# Patient Record
Sex: FEMALE | Race: WHITE | NOT HISPANIC OR LATINO
[De-identification: names, ages, dates, MRNs, and addresses within clinical notes are randomized per-mention and may not be internally consistent; named-entity substitution may affect disease eponyms.]

---

## 2018-04-06 ENCOUNTER — TRANSCRIPTION ENCOUNTER (OUTPATIENT)
Age: 28
End: 2018-04-06

## 2018-04-14 ENCOUNTER — TRANSCRIPTION ENCOUNTER (OUTPATIENT)
Age: 28
End: 2018-04-14

## 2018-10-08 ENCOUNTER — TRANSCRIPTION ENCOUNTER (OUTPATIENT)
Age: 28
End: 2018-10-08

## 2019-01-21 ENCOUNTER — RESULT REVIEW (OUTPATIENT)
Age: 29
End: 2019-01-21

## 2019-02-17 ENCOUNTER — TRANSCRIPTION ENCOUNTER (OUTPATIENT)
Age: 29
End: 2019-02-17

## 2019-02-26 ENCOUNTER — TRANSCRIPTION ENCOUNTER (OUTPATIENT)
Age: 29
End: 2019-02-26

## 2019-03-06 ENCOUNTER — OUTPATIENT (OUTPATIENT)
Dept: OUTPATIENT SERVICES | Facility: HOSPITAL | Age: 29
LOS: 1 days | End: 2019-03-06
Payer: COMMERCIAL

## 2019-03-06 VITALS
DIASTOLIC BLOOD PRESSURE: 84 MMHG | WEIGHT: 246.92 LBS | RESPIRATION RATE: 16 BRPM | SYSTOLIC BLOOD PRESSURE: 124 MMHG | TEMPERATURE: 97 F | HEART RATE: 86 BPM

## 2019-03-06 DIAGNOSIS — Z29.9 ENCOUNTER FOR PROPHYLACTIC MEASURES, UNSPECIFIED: ICD-10-CM

## 2019-03-06 DIAGNOSIS — E66.9 OBESITY, UNSPECIFIED: ICD-10-CM

## 2019-03-06 DIAGNOSIS — Z01.818 ENCOUNTER FOR OTHER PREPROCEDURAL EXAMINATION: ICD-10-CM

## 2019-03-06 DIAGNOSIS — N83.209 UNSPECIFIED OVARIAN CYST, UNSPECIFIED SIDE: ICD-10-CM

## 2019-03-06 DIAGNOSIS — R10.2 PELVIC AND PERINEAL PAIN: ICD-10-CM

## 2019-03-06 DIAGNOSIS — N36.9 URETHRAL DISORDER, UNSPECIFIED: Chronic | ICD-10-CM

## 2019-03-06 LAB
ANION GAP SERPL CALC-SCNC: 10 MMOL/L — SIGNIFICANT CHANGE UP (ref 5–17)
BASOPHILS # BLD AUTO: 0 K/UL — SIGNIFICANT CHANGE UP (ref 0–0.2)
BASOPHILS NFR BLD AUTO: 0.2 % — SIGNIFICANT CHANGE UP (ref 0–2)
BUN SERPL-MCNC: 12 MG/DL — SIGNIFICANT CHANGE UP (ref 8–20)
CALCIUM SERPL-MCNC: 9.3 MG/DL — SIGNIFICANT CHANGE UP (ref 8.6–10.2)
CHLORIDE SERPL-SCNC: 102 MMOL/L — SIGNIFICANT CHANGE UP (ref 98–107)
CO2 SERPL-SCNC: 26 MMOL/L — SIGNIFICANT CHANGE UP (ref 22–29)
CREAT SERPL-MCNC: 0.58 MG/DL — SIGNIFICANT CHANGE UP (ref 0.5–1.3)
EOSINOPHIL # BLD AUTO: 0.1 K/UL — SIGNIFICANT CHANGE UP (ref 0–0.5)
EOSINOPHIL NFR BLD AUTO: 0.6 % — SIGNIFICANT CHANGE UP (ref 0–6)
GLUCOSE SERPL-MCNC: 82 MG/DL — SIGNIFICANT CHANGE UP (ref 70–115)
HCT VFR BLD CALC: 38.8 % — SIGNIFICANT CHANGE UP (ref 37–47)
HGB BLD-MCNC: 13.3 G/DL — SIGNIFICANT CHANGE UP (ref 12–16)
LYMPHOCYTES # BLD AUTO: 25.3 % — SIGNIFICANT CHANGE UP (ref 20–55)
LYMPHOCYTES # BLD AUTO: 3.1 K/UL — SIGNIFICANT CHANGE UP (ref 1–4.8)
MCHC RBC-ENTMCNC: 29.2 PG — SIGNIFICANT CHANGE UP (ref 27–31)
MCHC RBC-ENTMCNC: 34.3 G/DL — SIGNIFICANT CHANGE UP (ref 32–36)
MCV RBC AUTO: 85.3 FL — SIGNIFICANT CHANGE UP (ref 81–99)
MONOCYTES # BLD AUTO: 0.9 K/UL — HIGH (ref 0–0.8)
MONOCYTES NFR BLD AUTO: 7.6 % — SIGNIFICANT CHANGE UP (ref 3–10)
NEUTROPHILS # BLD AUTO: 8.1 K/UL — HIGH (ref 1.8–8)
NEUTROPHILS NFR BLD AUTO: 66 % — SIGNIFICANT CHANGE UP (ref 37–73)
PLATELET # BLD AUTO: 295 K/UL — SIGNIFICANT CHANGE UP (ref 150–400)
POTASSIUM SERPL-MCNC: 4.1 MMOL/L — SIGNIFICANT CHANGE UP (ref 3.5–5.3)
POTASSIUM SERPL-SCNC: 4.1 MMOL/L — SIGNIFICANT CHANGE UP (ref 3.5–5.3)
RBC # BLD: 4.55 M/UL — SIGNIFICANT CHANGE UP (ref 4.4–5.2)
RBC # FLD: 13.6 % — SIGNIFICANT CHANGE UP (ref 11–15.6)
SODIUM SERPL-SCNC: 138 MMOL/L — SIGNIFICANT CHANGE UP (ref 135–145)
WBC # BLD: 12.3 K/UL — HIGH (ref 4.8–10.8)
WBC # FLD AUTO: 12.3 K/UL — HIGH (ref 4.8–10.8)

## 2019-03-06 PROCEDURE — 80048 BASIC METABOLIC PNL TOTAL CA: CPT

## 2019-03-06 PROCEDURE — 85027 COMPLETE CBC AUTOMATED: CPT

## 2019-03-06 PROCEDURE — 36415 COLL VENOUS BLD VENIPUNCTURE: CPT

## 2019-03-06 RX ORDER — SODIUM CHLORIDE 9 MG/ML
3 INJECTION INTRAMUSCULAR; INTRAVENOUS; SUBCUTANEOUS ONCE
Qty: 0 | Refills: 0 | Status: DISCONTINUED | OUTPATIENT
Start: 2019-03-11 | End: 2019-03-26

## 2019-03-06 NOTE — H&P PST ADULT - FAMILY HISTORY
Father  Still living? Yes, Estimated age: 61-70  Family history of diabetes mellitus, Age at diagnosis: Age Unknown  Family history of heart valve abnormality, Age at diagnosis: Age Unknown

## 2019-03-06 NOTE — H&P PST ADULT - HISTORY OF PRESENT ILLNESS
28 yr old female presents with c/o left lower abdominal  pain over past 2 months ( since dec 2018 )  Pain worse with pressure and before periods. LMP 19   . Last pap 2019 with internal sono and left ovarian cyst noted .

## 2019-03-06 NOTE — H&P PST ADULT - LAB RESULTS AND INTERPRETATION
wbc 12.3 results faxed to DR. Parul Alberto and called to Lacey in office . K DAmico NP 10:30 am 3/7/19

## 2019-03-06 NOTE — H&P PST ADULT - PSH
Urethral disorder  urethral dialtion at 20 yr old foe cystistis   chronic  UTI "S   no problem since then Urethral disorder  urethral dilation  at 20 yr old for cystitis  chronic  UTI "S   no problem since then

## 2019-03-11 ENCOUNTER — OUTPATIENT (OUTPATIENT)
Dept: INPATIENT UNIT | Facility: HOSPITAL | Age: 29
LOS: 1 days | Discharge: HOME | End: 2019-03-11
Payer: COMMERCIAL

## 2019-03-11 ENCOUNTER — RESULT REVIEW (OUTPATIENT)
Age: 29
End: 2019-03-11

## 2019-03-11 VITALS
SYSTOLIC BLOOD PRESSURE: 120 MMHG | OXYGEN SATURATION: 99 % | RESPIRATION RATE: 16 BRPM | DIASTOLIC BLOOD PRESSURE: 67 MMHG | HEART RATE: 94 BPM | WEIGHT: 179.9 LBS | TEMPERATURE: 98 F | HEIGHT: 63 IN

## 2019-03-11 VITALS
RESPIRATION RATE: 17 BRPM | DIASTOLIC BLOOD PRESSURE: 86 MMHG | OXYGEN SATURATION: 96 % | SYSTOLIC BLOOD PRESSURE: 110 MMHG | HEART RATE: 93 BPM | TEMPERATURE: 98 F

## 2019-03-11 DIAGNOSIS — E66.9 OBESITY, UNSPECIFIED: ICD-10-CM

## 2019-03-11 DIAGNOSIS — N83.209 UNSPECIFIED OVARIAN CYST, UNSPECIFIED SIDE: ICD-10-CM

## 2019-03-11 DIAGNOSIS — N36.9 URETHRAL DISORDER, UNSPECIFIED: Chronic | ICD-10-CM

## 2019-03-11 DIAGNOSIS — R10.2 PELVIC AND PERINEAL PAIN: ICD-10-CM

## 2019-03-11 PROCEDURE — 88112 CYTOPATH CELL ENHANCE TECH: CPT | Mod: 26

## 2019-03-11 PROCEDURE — 88305 TISSUE EXAM BY PATHOLOGIST: CPT | Mod: 26

## 2019-03-11 PROCEDURE — 88305 TISSUE EXAM BY PATHOLOGIST: CPT

## 2019-03-11 PROCEDURE — 49321 LAPAROSCOPY BIOPSY: CPT

## 2019-03-11 PROCEDURE — 88112 CYTOPATH CELL ENHANCE TECH: CPT

## 2019-03-11 RX ORDER — IBUPROFEN 200 MG
1 TABLET ORAL
Qty: 30 | Refills: 0 | OUTPATIENT
Start: 2019-03-11

## 2019-03-11 RX ORDER — SODIUM CHLORIDE 9 MG/ML
1000 INJECTION, SOLUTION INTRAVENOUS
Qty: 0 | Refills: 0 | Status: DISCONTINUED | OUTPATIENT
Start: 2019-03-11 | End: 2019-03-11

## 2019-03-11 RX ORDER — KETOROLAC TROMETHAMINE 30 MG/ML
30 SYRINGE (ML) INJECTION ONCE
Qty: 0 | Refills: 0 | Status: DISCONTINUED | OUTPATIENT
Start: 2019-03-11 | End: 2019-03-11

## 2019-03-11 RX ORDER — HYDROMORPHONE HYDROCHLORIDE 2 MG/ML
0.5 INJECTION INTRAMUSCULAR; INTRAVENOUS; SUBCUTANEOUS
Qty: 0 | Refills: 0 | Status: DISCONTINUED | OUTPATIENT
Start: 2019-03-11 | End: 2019-03-11

## 2019-03-11 RX ORDER — ONDANSETRON 8 MG/1
4 TABLET, FILM COATED ORAL ONCE
Qty: 0 | Refills: 0 | Status: COMPLETED | OUTPATIENT
Start: 2019-03-11 | End: 2019-03-11

## 2019-03-11 RX ADMIN — HYDROMORPHONE HYDROCHLORIDE 0.5 MILLIGRAM(S): 2 INJECTION INTRAMUSCULAR; INTRAVENOUS; SUBCUTANEOUS at 13:54

## 2019-03-11 RX ADMIN — ONDANSETRON 4 MILLIGRAM(S): 8 TABLET, FILM COATED ORAL at 14:40

## 2019-03-11 RX ADMIN — Medication 30 MILLIGRAM(S): at 16:27

## 2019-03-11 RX ADMIN — HYDROMORPHONE HYDROCHLORIDE 0.5 MILLIGRAM(S): 2 INJECTION INTRAMUSCULAR; INTRAVENOUS; SUBCUTANEOUS at 14:04

## 2019-03-11 RX ADMIN — Medication 30 MILLIGRAM(S): at 16:40

## 2019-03-11 NOTE — BRIEF OPERATIVE NOTE - POST-OP DX
Ovarian mass, left  03/11/2019  7cm, round, fungating - white and tan with irreg contour / irreg surface, friable on contact  Active  Parul Alberto

## 2019-03-11 NOTE — BRIEF OPERATIVE NOTE - PROCEDURE
<<-----Click on this checkbox to enter Procedure Laparoscopy  03/11/2019  diagnostic, with biopsies of left ovarian mass  Active  [id number]

## 2019-03-11 NOTE — ASU DISCHARGE PLAN (ADULT/PEDIATRIC). - MEDICATION SUMMARY - MEDICATIONS TO TAKE
I will START or STAY ON the medications listed below when I get home from the hospital:    oxyCODONE-acetaminophen 5 mg-325 mg oral tablet  -- 1 tab(s) by mouth every 6 hours MDD:4  -- Caution federal law prohibits the transfer of this drug to any person other  than the person for whom it was prescribed.  May cause drowsiness.  Alcohol may intensify this effect.  Use care when operating dangerous machinery.  This prescription cannot be refilled.  This product contains acetaminophen.  Do not use  with any other product containing acetaminophen to prevent possible liver damage.  Using more of this medication than prescribed may cause serious breathing problems.    -- Indication: For severe pain     mg oral tablet  -- 1 tab(s) by mouth every 8 hours   -- Do not take this drug if you are pregnant.  It is very important that you take or use this exactly as directed.  Do not skip doses or discontinue unless directed by your doctor.  May cause drowsiness or dizziness.  Obtain medical advice before taking any non-prescription drugs as some may affect the action of this medication.  Take with food or milk.    -- Indication: For moderate pain

## 2019-03-11 NOTE — BRIEF OPERATIVE NOTE - PRE-OP DX
Left ovarian cyst  03/11/2019  7.6cm by ultrasound  Active  Parul Alberto  Pelvic pain in female  03/11/2019    Active  Parul Alberto

## 2019-03-11 NOTE — BRIEF OPERATIVE NOTE - OPERATION/FINDINGS
Left ovarian mass:  7cm, round, fungating - white and tan with irreg contour / irreg surface, friable on contact, right tube and ovary WNL, uterus normal

## 2019-03-11 NOTE — ASU DISCHARGE PLAN (ADULT/PEDIATRIC). - ITEMS TO FOLLOWUP WITH YOUR PHYSICIAN'S
Please call Dr. Parul Alberto for appointment in 1-2 weeks for follow-up. Please call the office of Dr. Shreyas Alberto to see him on Wednesday for a consult.

## 2019-03-11 NOTE — BRIEF OPERATIVE NOTE - COMMENTS
Gyn/Onc consult called for abnormal appearance of left ovarian mass suspicious for cancer.  Dr Shreyas Alberto present within a few minutes and recommendations given to take biopsy and leave ovarian mass in situ - strong suspicion for borderline tumor and need for further W/U by Gyn/Onc.

## 2019-03-11 NOTE — ASU DISCHARGE PLAN (ADULT/PEDIATRIC). - ACTIVITY LEVEL
no weight bearing/no tampons/no douching/1-2 weeks/weight bearing as tolerated/nothing per vagina/no intercourse nothing per vagina/no heavy lifting/no weight bearing/weight bearing as tolerated/no intercourse/no douching/no tampons/1-2 weeks

## 2019-03-12 PROBLEM — Z00.00 ENCOUNTER FOR PREVENTIVE HEALTH EXAMINATION: Status: ACTIVE | Noted: 2019-03-12

## 2019-03-12 RX ORDER — IBUPROFEN 200 MG
1 TABLET ORAL
Qty: 0 | Refills: 0 | COMMUNITY

## 2019-03-13 ENCOUNTER — APPOINTMENT (OUTPATIENT)
Dept: GYNECOLOGIC ONCOLOGY | Facility: CLINIC | Age: 29
End: 2019-03-13
Payer: COMMERCIAL

## 2019-03-13 VITALS
BODY MASS INDEX: 31.89 KG/M2 | OXYGEN SATURATION: 98 % | HEART RATE: 66 BPM | SYSTOLIC BLOOD PRESSURE: 143 MMHG | TEMPERATURE: 98.1 F | DIASTOLIC BLOOD PRESSURE: 84 MMHG | HEIGHT: 63 IN | WEIGHT: 180 LBS

## 2019-03-13 DIAGNOSIS — Z78.9 OTHER SPECIFIED HEALTH STATUS: ICD-10-CM

## 2019-03-13 DIAGNOSIS — Z80.3 FAMILY HISTORY OF MALIGNANT NEOPLASM OF BREAST: ICD-10-CM

## 2019-03-13 DIAGNOSIS — Z80.0 FAMILY HISTORY OF MALIGNANT NEOPLASM OF DIGESTIVE ORGANS: ICD-10-CM

## 2019-03-13 DIAGNOSIS — F17.200 NICOTINE DEPENDENCE, UNSPECIFIED, UNCOMPLICATED: ICD-10-CM

## 2019-03-13 LAB
NON-GYNECOLOGICAL CYTOLOGY STUDY: SIGNIFICANT CHANGE UP
SURGICAL PATHOLOGY STUDY: SIGNIFICANT CHANGE UP

## 2019-03-13 PROCEDURE — 99245 OFF/OP CONSLTJ NEW/EST HI 55: CPT

## 2019-03-13 NOTE — END OF VISIT
[FreeTextEntry3] : This note accurately reflects the work and decisions made by me.\par Written by Agnes BELLO, acting as a scribe for Dr. Romeo Alberto.\par

## 2019-03-13 NOTE — HISTORY OF PRESENT ILLNESS
[FreeTextEntry1] : This 29yo nulligravida G P LMP 3/9/19 referred by Dr. Parul Alberto for evaluation of an ovarian mass. On 3/11/19, pt underwent a diagnostic laparoscopy for an ovarian cyst and pelvic pain. Operative findings included a 7cm left ovarian mass that was friable, fungating and irregular. I consulted intraoperatively and recommended biopsies of this mass as it appeared suspicious for a borderline tumor. Pt is recuperating well. Pain is managed with tylenol and ibuprofen. Denies fever, n/v, bowel or bladder issues. \par \par Pap smear-1/2019-hx of abnormal paps in her 20's s/p cryotherapy cervix\par \par

## 2019-03-13 NOTE — PHYSICAL EXAM
[Normal] : Bimanual Exam: Normal [de-identified] : Patient was interviewed and examined with chaperone present. Name of chaperone: Agnes Hernandez

## 2019-03-20 LAB — CANCER AG125 SERPL-ACNC: 657 U/ML

## 2019-03-25 ENCOUNTER — OTHER (OUTPATIENT)
Age: 29
End: 2019-03-25

## 2019-03-25 ENCOUNTER — FORM ENCOUNTER (OUTPATIENT)
Age: 29
End: 2019-03-25

## 2019-03-25 ENCOUNTER — APPOINTMENT (OUTPATIENT)
Dept: GYNECOLOGIC ONCOLOGY | Facility: CLINIC | Age: 29
End: 2019-03-25

## 2019-03-25 ENCOUNTER — APPOINTMENT (OUTPATIENT)
Dept: GYNECOLOGIC ONCOLOGY | Facility: CLINIC | Age: 29
End: 2019-03-25
Payer: COMMERCIAL

## 2019-03-25 PROCEDURE — 99214 OFFICE O/P EST MOD 30 MIN: CPT

## 2019-03-26 ENCOUNTER — APPOINTMENT (OUTPATIENT)
Dept: CT IMAGING | Facility: CLINIC | Age: 29
End: 2019-03-26
Payer: COMMERCIAL

## 2019-03-26 ENCOUNTER — OUTPATIENT (OUTPATIENT)
Dept: OUTPATIENT SERVICES | Facility: HOSPITAL | Age: 29
LOS: 1 days | End: 2019-03-26
Payer: COMMERCIAL

## 2019-03-26 DIAGNOSIS — Z00.8 ENCOUNTER FOR OTHER GENERAL EXAMINATION: ICD-10-CM

## 2019-03-26 DIAGNOSIS — N36.9 URETHRAL DISORDER, UNSPECIFIED: Chronic | ICD-10-CM

## 2019-03-26 PROBLEM — N30.90 CYSTITIS, UNSPECIFIED WITHOUT HEMATURIA: Chronic | Status: ACTIVE | Noted: 2019-03-06

## 2019-03-26 PROCEDURE — 71260 CT THORAX DX C+: CPT | Mod: 26

## 2019-03-26 PROCEDURE — 74177 CT ABD & PELVIS W/CONTRAST: CPT | Mod: 26

## 2019-03-26 PROCEDURE — 74177 CT ABD & PELVIS W/CONTRAST: CPT

## 2019-03-26 PROCEDURE — 71260 CT THORAX DX C+: CPT

## 2019-03-29 ENCOUNTER — APPOINTMENT (OUTPATIENT)
Dept: GYNECOLOGIC ONCOLOGY | Facility: CLINIC | Age: 29
End: 2019-03-29
Payer: COMMERCIAL

## 2019-03-29 PROCEDURE — 99214 OFFICE O/P EST MOD 30 MIN: CPT

## 2019-04-02 NOTE — REASON FOR VISIT
[FreeTextEntry1] : [facility]\par \par [facility] [phone] at [address] [place] [id number]\par

## 2019-04-02 NOTE — ASSESSMENT
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

## 2019-04-02 NOTE — HISTORY OF PRESENT ILLNESS
[FreeTextEntry1] : This 28y/o with an ovarian mass referred by Dr. Parul Alberto underwent a diagnostic laparoscopy on 03/11/2019. I consulted intraoperatively and recommended biopsies of the mass as it appeared suspicious for borderline tumor. I saw patient as an outpatient on 03/13/19 and recommended a CT C/A/P and ca-125. Pathology was still pending, she returns today to review final pathology.

## 2019-04-02 NOTE — PHYSICAL EXAM
[Normal] : No focal neurologic defects observed [de-identified] : Radha Schuster MA was present the entire time of gynecological exam [Fully active, able to carry on all pre-disease performance without restriction] : Status 0 - Fully active, able to carry on all pre-disease performance without restriction

## 2019-04-02 NOTE — END OF VISIT
[FreeTextEntry3] : Written by Radha Schuster, acting as a scribe for Dr. Romeo Alberto.\par This note accurately reflects the work and decision made by me.\par

## 2019-04-03 NOTE — HISTORY OF PRESENT ILLNESS
[FreeTextEntry1] : This 28y/o with a papillary serous neoplasm, at least borderline tumor with pelvic washing with clusters of atypical/neoplastic cells are present mixed with reactive mesothelials and inflammatory cells returns to the office today to review CT and ca-125 results. CT C/A/P from 03/26/2019 revealed confluent mass in the left adnexa measuring up to 10cm corresponding with known malignancy with extension into the rectouterine cul-de-sac. Mass also contacts the distal sigmoid colon. Trace of free fluid adjacent to the base of the cecum. No evidence of metastatic disease in the chest. Ca-125 from 03/25/19 was 657.

## 2019-04-03 NOTE — PHYSICAL EXAM
[Normal] : No focal neurologic defects observed [Fully active, able to carry on all pre-disease performance without restriction] : Status 0 - Fully active, able to carry on all pre-disease performance without restriction [de-identified] : Radha Schuster MA was present the entire time of gynecological exam

## 2019-04-03 NOTE — ASSESSMENT
[FreeTextEntry1] : I discussed the role for surgical intervention to remove her left tube and ovary through an open incision. I explained that on CT imaging this tumor appears to be contained without evidence of metastatic disease. I discussed the benefit of a laparotomy rather than laparoscopy. Patient is concerned with the amount of time she will be delayed from work. I explained that this pathology we have now is at least borderline and was a small sample of the tumor. There is a possibility that on final pathology from the recommended procedure there will be a malignancy.  I understand this was a lot to absorb, so I have asked her to return to the office in one week to sign consent form.

## 2019-04-03 NOTE — REASON FOR VISIT
[FreeTextEntry1] : [facility]\par \par [facility] [phone] at [address] [place] [id number]\par

## 2019-04-04 ENCOUNTER — APPOINTMENT (OUTPATIENT)
Dept: GYNECOLOGIC ONCOLOGY | Facility: CLINIC | Age: 29
End: 2019-04-04
Payer: COMMERCIAL

## 2019-04-04 VITALS
OXYGEN SATURATION: 99 % | HEART RATE: 99 BPM | SYSTOLIC BLOOD PRESSURE: 118 MMHG | HEIGHT: 63 IN | DIASTOLIC BLOOD PRESSURE: 80 MMHG | BODY MASS INDEX: 31.89 KG/M2 | WEIGHT: 180 LBS | TEMPERATURE: 99 F

## 2019-04-04 PROCEDURE — 99214 OFFICE O/P EST MOD 30 MIN: CPT

## 2019-04-04 RX ORDER — IBUPROFEN 800 MG/1
TABLET, FILM COATED ORAL
Refills: 0 | Status: DISCONTINUED | COMMUNITY
End: 2019-04-04

## 2019-04-08 ENCOUNTER — OUTPATIENT (OUTPATIENT)
Dept: OUTPATIENT SERVICES | Facility: HOSPITAL | Age: 29
LOS: 1 days | End: 2019-04-08
Payer: COMMERCIAL

## 2019-04-08 VITALS
RESPIRATION RATE: 20 BRPM | HEART RATE: 83 BPM | WEIGHT: 248.46 LBS | TEMPERATURE: 99 F | DIASTOLIC BLOOD PRESSURE: 79 MMHG | HEIGHT: 63 IN | SYSTOLIC BLOOD PRESSURE: 112 MMHG

## 2019-04-08 DIAGNOSIS — N36.9 URETHRAL DISORDER, UNSPECIFIED: Chronic | ICD-10-CM

## 2019-04-08 DIAGNOSIS — Z98.890 OTHER SPECIFIED POSTPROCEDURAL STATES: Chronic | ICD-10-CM

## 2019-04-08 DIAGNOSIS — C56.9 MALIGNANT NEOPLASM OF UNSPECIFIED OVARY: ICD-10-CM

## 2019-04-08 DIAGNOSIS — Z29.9 ENCOUNTER FOR PROPHYLACTIC MEASURES, UNSPECIFIED: ICD-10-CM

## 2019-04-08 LAB
ALBUMIN SERPL ELPH-MCNC: 4.4 G/DL — SIGNIFICANT CHANGE UP (ref 3.3–5.2)
ALP SERPL-CCNC: 86 U/L — SIGNIFICANT CHANGE UP (ref 40–120)
ALT FLD-CCNC: 15 U/L — SIGNIFICANT CHANGE UP
ANION GAP SERPL CALC-SCNC: 15 MMOL/L — SIGNIFICANT CHANGE UP (ref 5–17)
APTT BLD: 30.6 SEC — SIGNIFICANT CHANGE UP (ref 27.5–36.3)
AST SERPL-CCNC: 14 U/L — SIGNIFICANT CHANGE UP
BASOPHILS # BLD AUTO: 0 K/UL — SIGNIFICANT CHANGE UP (ref 0–0.2)
BASOPHILS NFR BLD AUTO: 0.3 % — SIGNIFICANT CHANGE UP (ref 0–2)
BILIRUB DIRECT SERPL-MCNC: 0.1 MG/DL — SIGNIFICANT CHANGE UP (ref 0–0.3)
BILIRUB INDIRECT FLD-MCNC: 0.3 MG/DL — SIGNIFICANT CHANGE UP (ref 0.2–1)
BILIRUB SERPL-MCNC: 0.4 MG/DL — SIGNIFICANT CHANGE UP (ref 0.4–2)
BLD GP AB SCN SERPL QL: SIGNIFICANT CHANGE UP
BUN SERPL-MCNC: 13 MG/DL — SIGNIFICANT CHANGE UP (ref 8–20)
CALCIUM SERPL-MCNC: 9.2 MG/DL — SIGNIFICANT CHANGE UP (ref 8.6–10.2)
CANCER AG125 SERPL-ACNC: 863 U/ML — HIGH
CHLORIDE SERPL-SCNC: 104 MMOL/L — SIGNIFICANT CHANGE UP (ref 98–107)
CO2 SERPL-SCNC: 23 MMOL/L — SIGNIFICANT CHANGE UP (ref 22–29)
CREAT SERPL-MCNC: 0.65 MG/DL — SIGNIFICANT CHANGE UP (ref 0.5–1.3)
EOSINOPHIL # BLD AUTO: 0.1 K/UL — SIGNIFICANT CHANGE UP (ref 0–0.5)
EOSINOPHIL NFR BLD AUTO: 1 % — SIGNIFICANT CHANGE UP (ref 0–6)
GLUCOSE SERPL-MCNC: 88 MG/DL — SIGNIFICANT CHANGE UP (ref 70–115)
HBA1C BLD-MCNC: 5.1 % — SIGNIFICANT CHANGE UP (ref 4–5.6)
HCG SERPL-ACNC: <4 MIU/ML — SIGNIFICANT CHANGE UP
HCT VFR BLD CALC: 39.9 % — SIGNIFICANT CHANGE UP (ref 37–47)
HGB BLD-MCNC: 13.5 G/DL — SIGNIFICANT CHANGE UP (ref 12–16)
INR BLD: 0.98 RATIO — SIGNIFICANT CHANGE UP (ref 0.88–1.16)
LYMPHOCYTES # BLD AUTO: 2 K/UL — SIGNIFICANT CHANGE UP (ref 1–4.8)
LYMPHOCYTES # BLD AUTO: 25.7 % — SIGNIFICANT CHANGE UP (ref 20–55)
MCHC RBC-ENTMCNC: 29.2 PG — SIGNIFICANT CHANGE UP (ref 27–31)
MCHC RBC-ENTMCNC: 33.8 G/DL — SIGNIFICANT CHANGE UP (ref 32–36)
MCV RBC AUTO: 86.2 FL — SIGNIFICANT CHANGE UP (ref 81–99)
MONOCYTES # BLD AUTO: 0.8 K/UL — SIGNIFICANT CHANGE UP (ref 0–0.8)
MONOCYTES NFR BLD AUTO: 9.4 % — SIGNIFICANT CHANGE UP (ref 3–10)
NEUTROPHILS # BLD AUTO: 5 K/UL — SIGNIFICANT CHANGE UP (ref 1.8–8)
NEUTROPHILS NFR BLD AUTO: 63.3 % — SIGNIFICANT CHANGE UP (ref 37–73)
PLATELET # BLD AUTO: 278 K/UL — SIGNIFICANT CHANGE UP (ref 150–400)
POTASSIUM SERPL-MCNC: 4 MMOL/L — SIGNIFICANT CHANGE UP (ref 3.5–5.3)
POTASSIUM SERPL-SCNC: 4 MMOL/L — SIGNIFICANT CHANGE UP (ref 3.5–5.3)
PROT SERPL-MCNC: 7.4 G/DL — SIGNIFICANT CHANGE UP (ref 6.6–8.7)
PROTHROM AB SERPL-ACNC: 11.3 SEC — SIGNIFICANT CHANGE UP (ref 10–12.9)
RBC # BLD: 4.63 M/UL — SIGNIFICANT CHANGE UP (ref 4.4–5.2)
RBC # FLD: 13.7 % — SIGNIFICANT CHANGE UP (ref 11–15.6)
SODIUM SERPL-SCNC: 142 MMOL/L — SIGNIFICANT CHANGE UP (ref 135–145)
TYPE + AB SCN PNL BLD: SIGNIFICANT CHANGE UP
WBC # BLD: 8 K/UL — SIGNIFICANT CHANGE UP (ref 4.8–10.8)
WBC # FLD AUTO: 8 K/UL — SIGNIFICANT CHANGE UP (ref 4.8–10.8)

## 2019-04-08 NOTE — H&P PST ADULT - NSICDXFAMILYHX_GEN_ALL_CORE_FT
FAMILY HISTORY:  Father  Still living? Yes, Estimated age: 61-70  Family history of diabetes mellitus, Age at diagnosis: Age Unknown  Family history of heart valve abnormality, Age at diagnosis: Age Unknown    Grandparent  Still living? Unknown  FH: breast cancer, Age at diagnosis: Age Unknown

## 2019-04-08 NOTE — H&P PST ADULT - NSICDXPASTSURGICALHX_GEN_ALL_CORE_FT
PAST SURGICAL HISTORY:  S/P laparoscopic surgery     Urethral disorder urethral dilation  at 20 yr old for cystitis  chronic  UTI "S   no problem since then PAST SURGICAL HISTORY:  S/P laparoscopic surgery laparoscopy with left ovarian mass biopsies    Urethral disorder urethral dilation  at 20 yr old for cystitis  chronic  UTI "S   no problem since then

## 2019-04-08 NOTE — H&P PST ADULT - HISTORY OF PRESENT ILLNESS
29 yr old nulligravida female presents with c/o left lower abdominal  pain over past 2 months ( since dec 2018 )  Pain worse with pressure and before periods. LMP 4/5/19 . Last pap Jan 2019 with internal sono and left ovarian cyst noted . 29 yr old nulligravida female presents with c/o left lower abdominal  pain since dec 2018.  Last pap Jan 2019 with internal sono and left ovarian cyst noted .  Patient state she had diagnostic laparoscopy with left ovarian mass biopsies with strong suspicion for borderline tumor  3/11/19.  She is now schedule for exploratory laparotomy left ovarian cystectomy, possible left salpingo oophorectomy staging

## 2019-04-08 NOTE — PATIENT PROFILE ADULT - NSPROEDALEARNPREF_GEN_A_NUR
individual instruction/Patient verbalized understanding of all instructions including surgical wash and pain scale

## 2019-04-08 NOTE — H&P PST ADULT - ASSESSMENT
29 year old female present with left ovarian mass now schedule for exploratory laparotomy left ovarian cystectomy, possible left salpingo oophorectomy and staging   CAPRINI VTE 2.0 SCORE [CLOT updated 2019]    AGE RELATED RISK FACTORS                                                       MOBILITY RELATED FACTORS  [ ] Age 41-60 years                                            (1 Point)                    [ ] Bed rest                                                        (1 Point)  [ ] Age: 61-74 years                                           (2 Points)                  [ ] Plaster cast                                                   (2 Points)  [ ] Age= 75 years                                              (3 Points)                    [ ] Bed bound for more than 72 hours                 (2 Points)    DISEASE RELATED RISK FACTORS                                               GENDER SPECIFIC FACTORS  [ ] Edema in the lower extremities                       (1 Point)              [ ] Pregnancy                                                     (1 Point)  [ ] Varicose veins                                               (1 Point)                     [ ] Post-partum < 6 weeks                                   (1 Point)             [x ] BMI > 25 Kg/m2                                            (1 Point)                     [ ] Hormonal therapy  or oral contraception          (1 Point)                 [ ] Sepsis (in the previous month)                        (1 Point)               [ ] History of pregnancy complications                 (1 point)  [ ] Pneumonia or serious lung disease                                               [ ] Unexplained or recurrent                     (1 Point)           (in the previous month)                               (1 Point)  [ ] Abnormal pulmonary function test                     (1 Point)                 SURGERY RELATED RISK FACTORS  [ ] Acute myocardial infarction                              (1 Point)               [ ]  Section                                             (1 Point)  [ ] Congestive heart failure (in the previous month)  (1 Point)      [ ] Minor surgery                                                  (1 Point)   [ ] Inflammatory bowel disease                             (1 Point)               [ ] Arthroscopic surgery                                        (2 Points)  [ ] Central venous access                                      (2 Points)                [x ] General surgery lasting more than 45 minutes (2 points)  [ x] Malignancy- Present or previous                   (2 Points)                [ ] Elective arthroplasty                                         (5 points)    [ ] Stroke (in the previous month)                          (5 Points)                                                                                                                                                           HEMATOLOGY RELATED FACTORS                                                 TRAUMA RELATED RISK FACTORS  [ ] Prior episodes of VTE                                     (3 Points)                [ ] Fracture of the hip, pelvis, or leg                       (5 Points)  [ ] Positive family history for VTE                         (3 Points)             [ ] Acute spinal cord injury (in the previous month)  (5 Points)  [ ] Prothrombin 80227 A                                     (3 Points)               [ ] Paralysis  (less than 1 month)                             (5 Points)  [ ] Factor V Leiden                                             (3 Points)                  [ ] Multiple Trauma within 1 month                        (5 Points)  [ ] Lupus anticoagulants                                     (3 Points)                                                           [ ] Anticardiolipin antibodies                               (3 Points)                                                       [ ] High homocysteine in the blood                      (3 Points)                                             [ ] Other congenital or acquired thrombophilia      (3 Points)                                                [ ] Heparin induced thrombocytopenia                  (3 Points)                                     Total Score [   5 ]

## 2019-04-08 NOTE — H&P PST ADULT - NSICDXPROBLEM_GEN_ALL_CORE_FT
PROBLEM DIAGNOSES  Problem: Ovarian cancer  Assessment and Plan: exploratory laparotomy left ovarian cystectomy, possible left salpingo oophorectomy and staging     Problem: Need for prophylactic measure  Assessment and Plan: moderate risk, the surgical team will evaluate for appropriate VTE prophylaxis

## 2019-04-09 ENCOUNTER — RESULT REVIEW (OUTPATIENT)
Age: 29
End: 2019-04-09

## 2019-04-09 ENCOUNTER — INPATIENT (INPATIENT)
Facility: HOSPITAL | Age: 29
LOS: 2 days | Discharge: HOME | DRG: 738 | End: 2019-04-12
Attending: OBSTETRICS & GYNECOLOGY | Admitting: OBSTETRICS & GYNECOLOGY
Payer: COMMERCIAL

## 2019-04-09 VITALS
HEART RATE: 100 BPM | HEIGHT: 63 IN | TEMPERATURE: 98 F | WEIGHT: 248.46 LBS | RESPIRATION RATE: 16 BRPM | SYSTOLIC BLOOD PRESSURE: 108 MMHG | DIASTOLIC BLOOD PRESSURE: 73 MMHG | OXYGEN SATURATION: 97 %

## 2019-04-09 DIAGNOSIS — N36.9 URETHRAL DISORDER, UNSPECIFIED: Chronic | ICD-10-CM

## 2019-04-09 DIAGNOSIS — Z98.890 OTHER SPECIFIED POSTPROCEDURAL STATES: Chronic | ICD-10-CM

## 2019-04-09 DIAGNOSIS — C56.9 MALIGNANT NEOPLASM OF UNSPECIFIED OVARY: ICD-10-CM

## 2019-04-09 PROBLEM — N83.209 UNSPECIFIED OVARIAN CYST, UNSPECIFIED SIDE: Chronic | Status: INACTIVE | Noted: 2019-03-06 | Resolved: 2019-04-08

## 2019-04-09 LAB
ABO RH CONFIRMATION: SIGNIFICANT CHANGE UP
GLUCOSE BLDC GLUCOMTR-MCNC: 92 MG/DL — SIGNIFICANT CHANGE UP (ref 70–99)
GLUCOSE BLDC GLUCOMTR-MCNC: 96 MG/DL — SIGNIFICANT CHANGE UP (ref 70–99)

## 2019-04-09 PROCEDURE — 88342 IMHCHEM/IMCYTCHM 1ST ANTB: CPT | Mod: 26

## 2019-04-09 PROCEDURE — 82248 BILIRUBIN DIRECT: CPT

## 2019-04-09 PROCEDURE — 49203: CPT | Mod: AS

## 2019-04-09 PROCEDURE — 85730 THROMBOPLASTIN TIME PARTIAL: CPT

## 2019-04-09 PROCEDURE — 83036 HEMOGLOBIN GLYCOSYLATED A1C: CPT

## 2019-04-09 PROCEDURE — 86850 RBC ANTIBODY SCREEN: CPT

## 2019-04-09 PROCEDURE — 85027 COMPLETE CBC AUTOMATED: CPT

## 2019-04-09 PROCEDURE — 38780 REMOVE ABDOMEN LYMPH NODES: CPT | Mod: AS,59

## 2019-04-09 PROCEDURE — 86900 BLOOD TYPING SEROLOGIC ABO: CPT

## 2019-04-09 PROCEDURE — 86901 BLOOD TYPING SEROLOGIC RH(D): CPT

## 2019-04-09 PROCEDURE — 86923 COMPATIBILITY TEST ELECTRIC: CPT

## 2019-04-09 PROCEDURE — G0463: CPT

## 2019-04-09 PROCEDURE — 88341 IMHCHEM/IMCYTCHM EA ADD ANTB: CPT | Mod: 26

## 2019-04-09 PROCEDURE — 88305 TISSUE EXAM BY PATHOLOGIST: CPT | Mod: 26

## 2019-04-09 PROCEDURE — 49203: CPT

## 2019-04-09 PROCEDURE — 38780 REMOVE ABDOMEN LYMPH NODES: CPT | Mod: 59

## 2019-04-09 PROCEDURE — 85610 PROTHROMBIN TIME: CPT

## 2019-04-09 PROCEDURE — 88304 TISSUE EXAM BY PATHOLOGIST: CPT | Mod: 26

## 2019-04-09 PROCEDURE — 86304 IMMUNOASSAY TUMOR CA 125: CPT

## 2019-04-09 PROCEDURE — 80053 COMPREHEN METABOLIC PANEL: CPT

## 2019-04-09 PROCEDURE — 84702 CHORIONIC GONADOTROPIN TEST: CPT

## 2019-04-09 PROCEDURE — 36415 COLL VENOUS BLD VENIPUNCTURE: CPT

## 2019-04-09 RX ORDER — ENOXAPARIN SODIUM 100 MG/ML
40 INJECTION SUBCUTANEOUS ONCE
Qty: 0 | Refills: 0 | Status: COMPLETED | OUTPATIENT
Start: 2019-04-09 | End: 2019-04-09

## 2019-04-09 RX ORDER — HYDROMORPHONE HYDROCHLORIDE 2 MG/ML
2 INJECTION INTRAMUSCULAR; INTRAVENOUS; SUBCUTANEOUS ONCE
Qty: 0 | Refills: 0 | Status: DISCONTINUED | OUTPATIENT
Start: 2019-04-09 | End: 2019-04-09

## 2019-04-09 RX ORDER — CEFOTETAN DISODIUM 1 G
2 VIAL (EA) INJECTION ONCE
Qty: 0 | Refills: 0 | Status: COMPLETED | OUTPATIENT
Start: 2019-04-09 | End: 2019-04-09

## 2019-04-09 RX ORDER — NALOXONE HYDROCHLORIDE 4 MG/.1ML
0.1 SPRAY NASAL
Qty: 0 | Refills: 0 | Status: DISCONTINUED | OUTPATIENT
Start: 2019-04-09 | End: 2019-04-12

## 2019-04-09 RX ORDER — KETOROLAC TROMETHAMINE 30 MG/ML
30 SYRINGE (ML) INJECTION EVERY 6 HOURS
Qty: 0 | Refills: 0 | Status: DISCONTINUED | OUTPATIENT
Start: 2019-04-09 | End: 2019-04-10

## 2019-04-09 RX ORDER — ENOXAPARIN SODIUM 100 MG/ML
40 INJECTION SUBCUTANEOUS EVERY 12 HOURS
Qty: 0 | Refills: 0 | Status: DISCONTINUED | OUTPATIENT
Start: 2019-04-10 | End: 2019-04-12

## 2019-04-09 RX ORDER — CEFOTETAN DISODIUM 1 G
1 VIAL (EA) INJECTION ONCE
Qty: 0 | Refills: 0 | Status: COMPLETED | OUTPATIENT
Start: 2019-04-09 | End: 2019-04-09

## 2019-04-09 RX ORDER — ENOXAPARIN SODIUM 100 MG/ML
40 INJECTION SUBCUTANEOUS
Qty: 56 | Refills: 0
Start: 2019-04-09 | End: 2019-05-06

## 2019-04-09 RX ORDER — ONDANSETRON 8 MG/1
4 TABLET, FILM COATED ORAL EVERY 6 HOURS
Qty: 0 | Refills: 0 | Status: DISCONTINUED | OUTPATIENT
Start: 2019-04-09 | End: 2019-04-12

## 2019-04-09 RX ORDER — SODIUM CHLORIDE 9 MG/ML
1000 INJECTION, SOLUTION INTRAVENOUS
Qty: 0 | Refills: 0 | Status: DISCONTINUED | OUTPATIENT
Start: 2019-04-09 | End: 2019-04-10

## 2019-04-09 RX ORDER — OXYCODONE AND ACETAMINOPHEN 5; 325 MG/1; MG/1
2 TABLET ORAL EVERY 4 HOURS
Qty: 0 | Refills: 0 | Status: DISCONTINUED | OUTPATIENT
Start: 2019-04-09 | End: 2019-04-09

## 2019-04-09 RX ORDER — OXYCODONE AND ACETAMINOPHEN 5; 325 MG/1; MG/1
1 TABLET ORAL EVERY 4 HOURS
Qty: 0 | Refills: 0 | Status: DISCONTINUED | OUTPATIENT
Start: 2019-04-09 | End: 2019-04-09

## 2019-04-09 RX ORDER — SACCHAROMYCES BOULARDII 250 MG
250 POWDER IN PACKET (EA) ORAL
Qty: 0 | Refills: 0 | Status: DISCONTINUED | OUTPATIENT
Start: 2019-04-09 | End: 2019-04-12

## 2019-04-09 RX ORDER — HYDROMORPHONE HYDROCHLORIDE 2 MG/ML
30 INJECTION INTRAMUSCULAR; INTRAVENOUS; SUBCUTANEOUS
Qty: 0 | Refills: 0 | Status: DISCONTINUED | OUTPATIENT
Start: 2019-04-09 | End: 2019-04-11

## 2019-04-09 RX ORDER — SODIUM CHLORIDE 9 MG/ML
3 INJECTION INTRAMUSCULAR; INTRAVENOUS; SUBCUTANEOUS EVERY 8 HOURS
Qty: 0 | Refills: 0 | Status: DISCONTINUED | OUTPATIENT
Start: 2019-04-09 | End: 2019-04-09

## 2019-04-09 RX ORDER — DEXTROSE MONOHYDRATE, SODIUM CHLORIDE, AND POTASSIUM CHLORIDE 50; .745; 4.5 G/1000ML; G/1000ML; G/1000ML
1000 INJECTION, SOLUTION INTRAVENOUS
Qty: 0 | Refills: 0 | Status: DISCONTINUED | OUTPATIENT
Start: 2019-04-09 | End: 2019-04-10

## 2019-04-09 RX ADMIN — HYDROMORPHONE HYDROCHLORIDE 30 MILLILITER(S): 2 INJECTION INTRAMUSCULAR; INTRAVENOUS; SUBCUTANEOUS at 16:36

## 2019-04-09 RX ADMIN — DEXTROSE MONOHYDRATE, SODIUM CHLORIDE, AND POTASSIUM CHLORIDE 125 MILLILITER(S): 50; .745; 4.5 INJECTION, SOLUTION INTRAVENOUS at 18:14

## 2019-04-09 RX ADMIN — Medication 30 MILLIGRAM(S): at 18:30

## 2019-04-09 RX ADMIN — HYDROMORPHONE HYDROCHLORIDE 2 MILLIGRAM(S): 2 INJECTION INTRAMUSCULAR; INTRAVENOUS; SUBCUTANEOUS at 19:15

## 2019-04-09 RX ADMIN — HYDROMORPHONE HYDROCHLORIDE 30 MILLILITER(S): 2 INJECTION INTRAMUSCULAR; INTRAVENOUS; SUBCUTANEOUS at 19:56

## 2019-04-09 RX ADMIN — HYDROMORPHONE HYDROCHLORIDE 30 MILLILITER(S): 2 INJECTION INTRAMUSCULAR; INTRAVENOUS; SUBCUTANEOUS at 14:47

## 2019-04-09 RX ADMIN — Medication 100 GRAM(S): at 21:49

## 2019-04-09 RX ADMIN — Medication 30 MILLIGRAM(S): at 18:14

## 2019-04-09 RX ADMIN — Medication 1 MILLIGRAM(S): at 14:38

## 2019-04-09 RX ADMIN — Medication 100 GRAM(S): at 12:39

## 2019-04-09 RX ADMIN — HYDROMORPHONE HYDROCHLORIDE 2 MILLIGRAM(S): 2 INJECTION INTRAMUSCULAR; INTRAVENOUS; SUBCUTANEOUS at 19:30

## 2019-04-09 RX ADMIN — ENOXAPARIN SODIUM 40 MILLIGRAM(S): 100 INJECTION SUBCUTANEOUS at 21:49

## 2019-04-09 NOTE — PROGRESS NOTE ADULT - SUBJECTIVE AND OBJECTIVE BOX
GYNECOLOGIC ONCOLOGY PROGRESS NOTE    POD#0    PROBLEMS:  Ovarian cancer  Need for prophylactic measure      Pt seen and examined at bedside.     SUBJECTIVE:    Patient is complaining of 10/10 pain. States she has been reluctant to use PCA due to fear/anxiety of taking too much.  Flatus: no  Admits to nausea, Denies Vomiting or Diarrhea.  Denies shortness of breath, chest pain or dyspnea on exertion.  Drinking water. Willing to try diet.     OBJECTIVE:     VITALS:  T(F): 98.7 (04-09-19 @ 16:00), Max: 98.7 (04-09-19 @ 16:00)  HR: 72 (04-09-19 @ 16:00) (68 - 102)  BP: 117/66 (04-09-19 @ 16:00) (101/65 - 139/79)  RR: 15 (04-09-19 @ 16:00) (14 - 17)  SpO2: 98% (04-09-19 @ 16:00) (97% - 99%)      I&O's Summary    09 Apr 2019 07:01  -  09 Apr 2019 17:33  --------------------------------------------------------  IN: 150 mL / OUT: 225 mL / NET: -75 mL    UO: 225 in 3 hours.     MEDICATIONS  (STANDING):  cefoTEtan  IVPB 1 Gram(s) IV Intermittent once  dextrose 5% + sodium chloride 0.45% with potassium chloride 20 mEq/L 1000 milliLiter(s) (125 mL/Hr) IV Continuous <Continuous>  enoxaparin Injectable 40 milliGRAM(s) SubCutaneous once  HYDROmorphone PCA (1 mG/mL) 30 milliLiter(s) PCA Continuous PCA Continuous  ketorolac   Injectable 30 milliGRAM(s) IV Push every 6 hours  lactated ringers. 1000 milliLiter(s) (75 mL/Hr) IV Continuous <Continuous>    MEDICATIONS  (PRN):  naloxone Injectable 0.1 milliGRAM(s) IV Push every 3 minutes PRN For ANY of the following changes in patient status:  A. RR LESS THAN 10 breaths per minute, B. Oxygen saturation LESS THAN 90%, C. Sedation score of 6  ondansetron    Tablet 4 milliGRAM(s) Oral every 6 hours PRN Nausea and/or Vomiting  ondansetron Injectable 4 milliGRAM(s) IV Push every 6 hours PRN Nausea  oxyCODONE    5 mG/acetaminophen 325 mG 1 Tablet(s) Oral every 4 hours PRN Moderate Pain (4 - 6)  oxyCODONE    5 mG/acetaminophen 325 mG 2 Tablet(s) Oral every 4 hours PRN Severe Pain (7 - 10)      Physical Exam:  Constitutional: NAD  Pulmonary: clear to auscultation bilaterally   Cardiovascular: Regular rate and rhythm   Abdomen: tender, soft, non-distended, normal bowel sounds  Extremities: no lower extremity edema or calve tenderness, Christopher's sign negative.  Incision: Clean, dry, intact, prevena in place.  Without signs of infection or hernia.      LABS:                 RADIOLOGY & ADDITIONAL TESTS:

## 2019-04-09 NOTE — PROGRESS NOTE ADULT - PROBLEM SELECTOR PLAN 1
Pain control via dilaudid PCA and toradol  DVT prophylaxis with lovenox and SCD's  Regular diet  Incentive spirometer  Ambulate as tolerated  Continue IVF until AM  Follow up void  Will follow up AM labs Patient educated on use of PCA and lock out feature, will F/U use of PCA.   Pain control via dilaudid PCA and toradol  DVT prophylaxis with lovenox and SCD's  Regular diet  Incentive spirometer  Ambulate as tolerated  Continue IVF until AM  Follow up void  Will follow up AM labs

## 2019-04-10 ENCOUNTER — RESULT REVIEW (OUTPATIENT)
Age: 29
End: 2019-04-10

## 2019-04-10 LAB
ALBUMIN SERPL ELPH-MCNC: 3.7 G/DL — SIGNIFICANT CHANGE UP (ref 3.3–5.2)
ALP SERPL-CCNC: 76 U/L — SIGNIFICANT CHANGE UP (ref 40–120)
ALT FLD-CCNC: 15 U/L — SIGNIFICANT CHANGE UP
ANION GAP SERPL CALC-SCNC: 12 MMOL/L — SIGNIFICANT CHANGE UP (ref 5–17)
ANISOCYTOSIS BLD QL: SLIGHT — SIGNIFICANT CHANGE UP
AST SERPL-CCNC: 14 U/L — SIGNIFICANT CHANGE UP
BILIRUB SERPL-MCNC: 0.5 MG/DL — SIGNIFICANT CHANGE UP (ref 0.4–2)
BUN SERPL-MCNC: 8 MG/DL — SIGNIFICANT CHANGE UP (ref 8–20)
CALCIUM SERPL-MCNC: 8.6 MG/DL — SIGNIFICANT CHANGE UP (ref 8.6–10.2)
CHLORIDE SERPL-SCNC: 106 MMOL/L — SIGNIFICANT CHANGE UP (ref 98–107)
CO2 SERPL-SCNC: 22 MMOL/L — SIGNIFICANT CHANGE UP (ref 22–29)
CREAT SERPL-MCNC: 0.59 MG/DL — SIGNIFICANT CHANGE UP (ref 0.5–1.3)
GLUCOSE SERPL-MCNC: 129 MG/DL — HIGH (ref 70–115)
HCT VFR BLD CALC: 36.2 % — LOW (ref 37–47)
HGB BLD-MCNC: 12.3 G/DL — SIGNIFICANT CHANGE UP (ref 12–16)
LYMPHOCYTES # BLD AUTO: 8 % — LOW (ref 20–55)
MACROCYTES BLD QL: SLIGHT — SIGNIFICANT CHANGE UP
MAGNESIUM SERPL-MCNC: 2.2 MG/DL — SIGNIFICANT CHANGE UP (ref 1.6–2.6)
MCHC RBC-ENTMCNC: 29 PG — SIGNIFICANT CHANGE UP (ref 27–31)
MCHC RBC-ENTMCNC: 34 G/DL — SIGNIFICANT CHANGE UP (ref 32–36)
MCV RBC AUTO: 85.4 FL — SIGNIFICANT CHANGE UP (ref 81–99)
MONOCYTES NFR BLD AUTO: 8 % — SIGNIFICANT CHANGE UP (ref 3–10)
NEUTROPHILS NFR BLD AUTO: 84 % — HIGH (ref 37–73)
OVALOCYTES BLD QL SMEAR: SLIGHT — SIGNIFICANT CHANGE UP
PLAT MORPH BLD: NORMAL — SIGNIFICANT CHANGE UP
PLATELET # BLD AUTO: 262 K/UL — SIGNIFICANT CHANGE UP (ref 150–400)
POIKILOCYTOSIS BLD QL AUTO: SLIGHT — SIGNIFICANT CHANGE UP
POTASSIUM SERPL-MCNC: 4.5 MMOL/L — SIGNIFICANT CHANGE UP (ref 3.5–5.3)
POTASSIUM SERPL-SCNC: 4.5 MMOL/L — SIGNIFICANT CHANGE UP (ref 3.5–5.3)
PROT SERPL-MCNC: 6.3 G/DL — LOW (ref 6.6–8.7)
RBC # BLD: 4.24 M/UL — LOW (ref 4.4–5.2)
RBC # FLD: 13.5 % — SIGNIFICANT CHANGE UP (ref 11–15.6)
RBC BLD AUTO: ABNORMAL
SODIUM SERPL-SCNC: 140 MMOL/L — SIGNIFICANT CHANGE UP (ref 135–145)
WBC # BLD: 16.4 K/UL — HIGH (ref 4.8–10.8)
WBC # FLD AUTO: 16.4 K/UL — HIGH (ref 4.8–10.8)

## 2019-04-10 PROCEDURE — 88341 IMHCHEM/IMCYTCHM EA ADD ANTB: CPT | Mod: 26

## 2019-04-10 PROCEDURE — 88342 IMHCHEM/IMCYTCHM 1ST ANTB: CPT | Mod: 26

## 2019-04-10 PROCEDURE — 88112 CYTOPATH CELL ENHANCE TECH: CPT | Mod: 26

## 2019-04-10 PROCEDURE — 88305 TISSUE EXAM BY PATHOLOGIST: CPT | Mod: 26

## 2019-04-10 RX ORDER — DEXTROSE MONOHYDRATE, SODIUM CHLORIDE, AND POTASSIUM CHLORIDE 50; .745; 4.5 G/1000ML; G/1000ML; G/1000ML
1000 INJECTION, SOLUTION INTRAVENOUS
Qty: 0 | Refills: 0 | Status: DISCONTINUED | OUTPATIENT
Start: 2019-04-10 | End: 2019-04-11

## 2019-04-10 RX ORDER — SODIUM CHLORIDE 9 MG/ML
1000 INJECTION, SOLUTION INTRAVENOUS
Qty: 0 | Refills: 0 | Status: DISCONTINUED | OUTPATIENT
Start: 2019-04-10 | End: 2019-04-10

## 2019-04-10 RX ADMIN — Medication 30 MILLIGRAM(S): at 00:15

## 2019-04-10 RX ADMIN — Medication 30 MILLIGRAM(S): at 01:00

## 2019-04-10 RX ADMIN — Medication 30 MILLIGRAM(S): at 17:36

## 2019-04-10 RX ADMIN — ENOXAPARIN SODIUM 40 MILLIGRAM(S): 100 INJECTION SUBCUTANEOUS at 23:14

## 2019-04-10 RX ADMIN — HYDROMORPHONE HYDROCHLORIDE 30 MILLILITER(S): 2 INJECTION INTRAMUSCULAR; INTRAVENOUS; SUBCUTANEOUS at 07:22

## 2019-04-10 RX ADMIN — Medication 30 MILLIGRAM(S): at 11:02

## 2019-04-10 RX ADMIN — Medication 30 MILLIGRAM(S): at 06:00

## 2019-04-10 RX ADMIN — ENOXAPARIN SODIUM 40 MILLIGRAM(S): 100 INJECTION SUBCUTANEOUS at 10:59

## 2019-04-10 RX ADMIN — Medication 30 MILLIGRAM(S): at 23:15

## 2019-04-10 RX ADMIN — Medication 250 MILLIGRAM(S): at 05:17

## 2019-04-10 RX ADMIN — Medication 30 MILLIGRAM(S): at 11:00

## 2019-04-10 RX ADMIN — Medication 0.5 MILLIGRAM(S): at 13:26

## 2019-04-10 RX ADMIN — ONDANSETRON 4 MILLIGRAM(S): 8 TABLET, FILM COATED ORAL at 13:45

## 2019-04-10 RX ADMIN — Medication 30 MILLIGRAM(S): at 05:17

## 2019-04-10 RX ADMIN — HYDROMORPHONE HYDROCHLORIDE 30 MILLILITER(S): 2 INJECTION INTRAMUSCULAR; INTRAVENOUS; SUBCUTANEOUS at 19:00

## 2019-04-10 RX ADMIN — Medication 250 MILLIGRAM(S): at 17:36

## 2019-04-10 NOTE — PROGRESS NOTE ADULT - SUBJECTIVE AND OBJECTIVE BOX
GYNECOLOGIC ONCOLOGY PROGRESS NOTE    POD#1    PROBLEMS:  Ovarian cancer  Need for prophylactic measure      Pt seen and examined at bedside.     SUBJECTIVE:    Patient is without acute complaints.  Pain well-controlled.  Flatus: negative  Denies Nausea, Vomiting or Diarrhea.  Denies shortness of breath, chest pain or dyspnea on exertion.  Tolerating fluids    OBJECTIVE:     VITALS:  T(F): 98.1 (04-10-19 @ 08:48), Max: 98.7 (04-09-19 @ 16:00)  HR: 67 (04-10-19 @ 08:48) (67 - 108)  BP: 111/77 (04-10-19 @ 08:48) (101/65 - 139/79)  RR: 18 (04-10-19 @ 08:48) (14 - 18)  SpO2: 97% (04-10-19 @ 08:48) (95% - 99%)  Wt(kg): --    I&O's Summary    09 Apr 2019 07:01  -  10 Apr 2019 07:00  --------------------------------------------------------  IN: 1650 mL / OUT: 2425 mL / NET: -775 mL        MEDICATIONS  (STANDING):  dextrose 5% + sodium chloride 0.45% with potassium chloride 20 mEq/L 1000 milliLiter(s) (125 mL/Hr) IV Continuous <Continuous>  enoxaparin Injectable 40 milliGRAM(s) SubCutaneous every 12 hours  HYDROmorphone PCA (1 mG/mL) 30 milliLiter(s) PCA Continuous PCA Continuous  ketorolac   Injectable 30 milliGRAM(s) IV Push every 6 hours  lactated ringers. 1000 milliLiter(s) (75 mL/Hr) IV Continuous <Continuous>  LORazepam   Injectable 0.5 milliGRAM(s) IV Push once  saccharomyces boulardii 250 milliGRAM(s) Oral two times a day    MEDICATIONS  (PRN):  naloxone Injectable 0.1 milliGRAM(s) IV Push every 3 minutes PRN For ANY of the following changes in patient status:  A. RR LESS THAN 10 breaths per minute, B. Oxygen saturation LESS THAN 90%, C. Sedation score of 6  ondansetron    Tablet 4 milliGRAM(s) Oral every 6 hours PRN Nausea and/or Vomiting  ondansetron Injectable 4 milliGRAM(s) IV Push every 6 hours PRN Nausea      Physical Exam:  Constitutional: NAD  Pulmonary: clear to auscultation bilaterally   Cardiovascular: Regular rate and rhythm   Abdomen: soft, non-tender, non-distended, normal bowel signs  Extremities: no lower extremity edema or calve tenderness, Christopher's sign negative.  Incision: covered by prevena       LABS:                        12.3   16.4  )-----------( 262      ( 10 Apr 2019 06:38 )             36.2     04-10    140  |  106  |  8.0  ----------------------------<  129<H>  4.5   |  22.0  |  0.59    Ca    8.6      10 Apr 2019 06:38  Mg     2.2     04-10    TPro  6.3<L>  /  Alb  3.7  /  TBili  0.5  /  DBili  x   /  AST  14  /  ALT  15  /  AlkPhos  76  04-10    PT/INR - ( 08 Apr 2019 11:28 )   PT: 11.3 sec;   INR: 0.98 ratio         PTT - ( 08 Apr 2019 11:28 )  PTT:30.6 sec      RADIOLOGY & ADDITIONAL TESTS:

## 2019-04-10 NOTE — PROGRESS NOTE ADULT - SUBJECTIVE AND OBJECTIVE BOX
Patient seen at bedside with family present, she is OOB to chair.   Denies flatus at this point.   She had an episode of anxiety today, for which she was given ativan, states she woke from sleep with the thought that she could not breathe.   Patient has calmed down since ativan, and is feeling less anxious.   She has tried eating earlier but had an episode of vomiting following her anxiety attack.  Patient has had additional food without difficulty  Patient has not yet voided since removal of arreola, due to being fearful of walking to the bathroom. Discussed bedpan vs commode with patient.   Will speak with RN regarding this discussion.   Patient and family has numerous questions that were answered fully. All parties seem to understand all that was discussed.     Will discuss with Dr. Alberto. Patient seen at bedside with Dr. Alberto, with family present, she is OOB to chair.   Denies flatus at this point.   She had an episode of anxiety today, for which she was given ativan, states she woke from sleep with the thought that she could not breathe.   Patient has calmed down since ativan, and is feeling less anxious.   She has tried eating earlier but had an episode of vomiting following her anxiety attack.  Patient has had additional food without difficulty  Patient voiding.  Patient and family has numerous questions that were answered fully. All parties seem to understand all that was discussed.     Dr. Alberto called away for emergency during rounds, will be unable to write note.

## 2019-04-11 LAB
ANION GAP SERPL CALC-SCNC: 11 MMOL/L — SIGNIFICANT CHANGE UP (ref 5–17)
BASOPHILS # BLD AUTO: 0 K/UL — SIGNIFICANT CHANGE UP (ref 0–0.2)
BASOPHILS NFR BLD AUTO: 0.2 % — SIGNIFICANT CHANGE UP (ref 0–2)
BUN SERPL-MCNC: 7 MG/DL — LOW (ref 8–20)
CALCIUM SERPL-MCNC: 8.6 MG/DL — SIGNIFICANT CHANGE UP (ref 8.6–10.2)
CHLORIDE SERPL-SCNC: 104 MMOL/L — SIGNIFICANT CHANGE UP (ref 98–107)
CO2 SERPL-SCNC: 24 MMOL/L — SIGNIFICANT CHANGE UP (ref 22–29)
CREAT SERPL-MCNC: 0.64 MG/DL — SIGNIFICANT CHANGE UP (ref 0.5–1.3)
EOSINOPHIL # BLD AUTO: 0 K/UL — SIGNIFICANT CHANGE UP (ref 0–0.5)
EOSINOPHIL NFR BLD AUTO: 0.4 % — SIGNIFICANT CHANGE UP (ref 0–6)
GLUCOSE SERPL-MCNC: 103 MG/DL — SIGNIFICANT CHANGE UP (ref 70–115)
HCT VFR BLD CALC: 33.7 % — LOW (ref 37–47)
HGB BLD-MCNC: 11 G/DL — LOW (ref 12–16)
LYMPHOCYTES # BLD AUTO: 18 % — LOW (ref 20–55)
LYMPHOCYTES # BLD AUTO: 2.1 K/UL — SIGNIFICANT CHANGE UP (ref 1–4.8)
MCHC RBC-ENTMCNC: 28.6 PG — SIGNIFICANT CHANGE UP (ref 27–31)
MCHC RBC-ENTMCNC: 32.6 G/DL — SIGNIFICANT CHANGE UP (ref 32–36)
MCV RBC AUTO: 87.5 FL — SIGNIFICANT CHANGE UP (ref 81–99)
MONOCYTES # BLD AUTO: 1.1 K/UL — HIGH (ref 0–0.8)
MONOCYTES NFR BLD AUTO: 9.4 % — SIGNIFICANT CHANGE UP (ref 3–10)
NEUTROPHILS # BLD AUTO: 8.3 K/UL — HIGH (ref 1.8–8)
NEUTROPHILS NFR BLD AUTO: 71.8 % — SIGNIFICANT CHANGE UP (ref 37–73)
PLATELET # BLD AUTO: 229 K/UL — SIGNIFICANT CHANGE UP (ref 150–400)
POTASSIUM SERPL-MCNC: 3.6 MMOL/L — SIGNIFICANT CHANGE UP (ref 3.5–5.3)
POTASSIUM SERPL-SCNC: 3.6 MMOL/L — SIGNIFICANT CHANGE UP (ref 3.5–5.3)
RBC # BLD: 3.85 M/UL — LOW (ref 4.4–5.2)
RBC # FLD: 13.9 % — SIGNIFICANT CHANGE UP (ref 11–15.6)
SODIUM SERPL-SCNC: 139 MMOL/L — SIGNIFICANT CHANGE UP (ref 135–145)
WBC # BLD: 11.6 K/UL — HIGH (ref 4.8–10.8)
WBC # FLD AUTO: 11.6 K/UL — HIGH (ref 4.8–10.8)

## 2019-04-11 RX ORDER — OXYCODONE AND ACETAMINOPHEN 5; 325 MG/1; MG/1
2 TABLET ORAL EVERY 4 HOURS
Qty: 0 | Refills: 0 | Status: DISCONTINUED | OUTPATIENT
Start: 2019-04-11 | End: 2019-04-12

## 2019-04-11 RX ORDER — OXYCODONE AND ACETAMINOPHEN 5; 325 MG/1; MG/1
1 TABLET ORAL EVERY 4 HOURS
Qty: 0 | Refills: 0 | Status: DISCONTINUED | OUTPATIENT
Start: 2019-04-11 | End: 2019-04-12

## 2019-04-11 RX ORDER — ACETAMINOPHEN 500 MG
650 TABLET ORAL ONCE
Qty: 0 | Refills: 0 | Status: COMPLETED | OUTPATIENT
Start: 2019-04-11 | End: 2019-04-11

## 2019-04-11 RX ADMIN — Medication 500 MILLIGRAM(S): at 17:41

## 2019-04-11 RX ADMIN — ENOXAPARIN SODIUM 40 MILLIGRAM(S): 100 INJECTION SUBCUTANEOUS at 20:26

## 2019-04-11 RX ADMIN — OXYCODONE AND ACETAMINOPHEN 2 TABLET(S): 5; 325 TABLET ORAL at 18:58

## 2019-04-11 RX ADMIN — OXYCODONE AND ACETAMINOPHEN 2 TABLET(S): 5; 325 TABLET ORAL at 13:26

## 2019-04-11 RX ADMIN — OXYCODONE AND ACETAMINOPHEN 2 TABLET(S): 5; 325 TABLET ORAL at 18:21

## 2019-04-11 RX ADMIN — OXYCODONE AND ACETAMINOPHEN 2 TABLET(S): 5; 325 TABLET ORAL at 23:25

## 2019-04-11 RX ADMIN — ENOXAPARIN SODIUM 40 MILLIGRAM(S): 100 INJECTION SUBCUTANEOUS at 11:16

## 2019-04-11 RX ADMIN — HYDROMORPHONE HYDROCHLORIDE 30 MILLILITER(S): 2 INJECTION INTRAMUSCULAR; INTRAVENOUS; SUBCUTANEOUS at 07:25

## 2019-04-11 RX ADMIN — Medication 30 MILLIGRAM(S): at 00:00

## 2019-04-11 RX ADMIN — OXYCODONE AND ACETAMINOPHEN 2 TABLET(S): 5; 325 TABLET ORAL at 10:20

## 2019-04-11 RX ADMIN — OXYCODONE AND ACETAMINOPHEN 2 TABLET(S): 5; 325 TABLET ORAL at 14:11

## 2019-04-11 RX ADMIN — Medication 500 MILLIGRAM(S): at 18:10

## 2019-04-11 RX ADMIN — Medication 250 MILLIGRAM(S): at 17:42

## 2019-04-11 RX ADMIN — Medication 250 MILLIGRAM(S): at 05:45

## 2019-04-11 RX ADMIN — OXYCODONE AND ACETAMINOPHEN 2 TABLET(S): 5; 325 TABLET ORAL at 09:40

## 2019-04-11 RX ADMIN — OXYCODONE AND ACETAMINOPHEN 2 TABLET(S): 5; 325 TABLET ORAL at 22:30

## 2019-04-11 NOTE — PROGRESS NOTE ADULT - SUBJECTIVE AND OBJECTIVE BOX
GYNECOLOGIC ONCOLOGY PROGRESS NOTE    POD#2    PROBLEMS:  Ovarian cancer  Need for prophylactic measure      Pt seen and examined at bedside.     SUBJECTIVE:    Patient is without complaints.  Pain well-controlled.  Flatus: negative  Denies Nausea, Vomiting or Diarrhea.  Denies shortness of breath, chest pain or dyspnea on exertion.  Tolerating diet.    OBJECTIVE:     VITALS:  T(F): 97.6 (04-11-19 @ 00:08), Max: 98.9 (04-10-19 @ 20:09)  HR: 119 (04-11-19 @ 00:08) (67 - 119)  BP: 94/59 (04-11-19 @ 00:08) (94/59 - 120/74)  RR: 18 (04-11-19 @ 00:08) (17 - 19)  SpO2: 95% (04-11-19 @ 00:08) (95% - 99%)  Wt(kg): --    I&O's Summary    10 Apr 2019 07:01  -  11 Apr 2019 07:00  --------------------------------------------------------  IN: 1110 mL / OUT: 200 mL / NET: 910 mL        MEDICATIONS  (STANDING):  dextrose 5% + sodium chloride 0.45% with potassium chloride 20 mEq/L 1000 milliLiter(s) (30 mL/Hr) IV Continuous <Continuous>  enoxaparin Injectable 40 milliGRAM(s) SubCutaneous every 12 hours  HYDROmorphone PCA (1 mG/mL) 30 milliLiter(s) PCA Continuous PCA Continuous  LORazepam   Injectable 0.5 milliGRAM(s) IV Push once  naproxen 500 milliGRAM(s) Oral every 12 hours  saccharomyces boulardii 250 milliGRAM(s) Oral two times a day    MEDICATIONS  (PRN):  LORazepam     Tablet 0.5 milliGRAM(s) Oral every 6 hours PRN Anxiety  naloxone Injectable 0.1 milliGRAM(s) IV Push every 3 minutes PRN For ANY of the following changes in patient status:  A. RR LESS THAN 10 breaths per minute, B. Oxygen saturation LESS THAN 90%, C. Sedation score of 6  ondansetron    Tablet 4 milliGRAM(s) Oral every 6 hours PRN Nausea and/or Vomiting  ondansetron Injectable 4 milliGRAM(s) IV Push every 6 hours PRN Nausea      Physical Exam:  Constitutional: NAD  Pulmonary: clear to auscultation bilaterally   Cardiovascular: Regular rate and rhythm   Abdomen: soft, non-tender, non-distended, normal bowel sounds  Extremities: no lower extremity edema or calve tenderness, Christopher's sign negative.  Incision: covered by prevena      LABS:                        12.3   16.4  )-----------( 262      ( 10 Apr 2019 06:38 )             36.2     04-10    140  |  106  |  8.0  ----------------------------<  129<H>  4.5   |  22.0  |  0.59    Ca    8.6      10 Apr 2019 06:38  Mg     2.2     04-10    TPro  6.3<L>  /  Alb  3.7  /  TBili  0.5  /  DBili  x   /  AST  14  /  ALT  15  /  AlkPhos  76  04-10          RADIOLOGY & ADDITIONAL TESTS:

## 2019-04-11 NOTE — PROGRESS NOTE ADULT - SUBJECTIVE AND OBJECTIVE BOX
patient tolerated walking, pain is well controlled with po pain meds, no flatus, no cp , no leg pain or sob, felt dizzy after walking , beside that denies any other complains , her CBC was stable with hgb of 11 , wbc of 11.6 down from 16 , continue post op care , encourage ambulation . consider d/c home tomorrow , dr villafana updated .

## 2019-04-12 ENCOUNTER — TRANSCRIPTION ENCOUNTER (OUTPATIENT)
Age: 29
End: 2019-04-12

## 2019-04-12 VITALS
RESPIRATION RATE: 18 BRPM | TEMPERATURE: 99 F | DIASTOLIC BLOOD PRESSURE: 83 MMHG | HEART RATE: 93 BPM | SYSTOLIC BLOOD PRESSURE: 128 MMHG | OXYGEN SATURATION: 93 %

## 2019-04-12 PROBLEM — N83.9 NONINFLAMMATORY DISORDER OF OVARY, FALLOPIAN TUBE AND BROAD LIGAMENT, UNSPECIFIED: Chronic | Status: ACTIVE | Noted: 2019-04-08

## 2019-04-12 LAB
ANION GAP SERPL CALC-SCNC: 12 MMOL/L — SIGNIFICANT CHANGE UP (ref 5–17)
BASOPHILS # BLD AUTO: 0 K/UL — SIGNIFICANT CHANGE UP (ref 0–0.2)
BASOPHILS NFR BLD AUTO: 0.1 % — SIGNIFICANT CHANGE UP (ref 0–2)
BUN SERPL-MCNC: 6 MG/DL — LOW (ref 8–20)
CALCIUM SERPL-MCNC: 8.7 MG/DL — SIGNIFICANT CHANGE UP (ref 8.6–10.2)
CHLORIDE SERPL-SCNC: 106 MMOL/L — SIGNIFICANT CHANGE UP (ref 98–107)
CO2 SERPL-SCNC: 24 MMOL/L — SIGNIFICANT CHANGE UP (ref 22–29)
CREAT SERPL-MCNC: 0.56 MG/DL — SIGNIFICANT CHANGE UP (ref 0.5–1.3)
EOSINOPHIL # BLD AUTO: 0.2 K/UL — SIGNIFICANT CHANGE UP (ref 0–0.5)
EOSINOPHIL NFR BLD AUTO: 1.7 % — SIGNIFICANT CHANGE UP (ref 0–6)
GLUCOSE SERPL-MCNC: 107 MG/DL — SIGNIFICANT CHANGE UP (ref 70–115)
HCT VFR BLD CALC: 33.2 % — LOW (ref 37–47)
HGB BLD-MCNC: 11.1 G/DL — LOW (ref 12–16)
LYMPHOCYTES # BLD AUTO: 2.2 K/UL — SIGNIFICANT CHANGE UP (ref 1–4.8)
LYMPHOCYTES # BLD AUTO: 22.9 % — SIGNIFICANT CHANGE UP (ref 20–55)
MCHC RBC-ENTMCNC: 29.1 PG — SIGNIFICANT CHANGE UP (ref 27–31)
MCHC RBC-ENTMCNC: 33.4 G/DL — SIGNIFICANT CHANGE UP (ref 32–36)
MCV RBC AUTO: 86.9 FL — SIGNIFICANT CHANGE UP (ref 81–99)
MONOCYTES # BLD AUTO: 0.9 K/UL — HIGH (ref 0–0.8)
MONOCYTES NFR BLD AUTO: 9.8 % — SIGNIFICANT CHANGE UP (ref 3–10)
NEUTROPHILS # BLD AUTO: 6.1 K/UL — SIGNIFICANT CHANGE UP (ref 1.8–8)
NEUTROPHILS NFR BLD AUTO: 65.3 % — SIGNIFICANT CHANGE UP (ref 37–73)
PLATELET # BLD AUTO: 224 K/UL — SIGNIFICANT CHANGE UP (ref 150–400)
POTASSIUM SERPL-MCNC: 3.5 MMOL/L — SIGNIFICANT CHANGE UP (ref 3.5–5.3)
POTASSIUM SERPL-SCNC: 3.5 MMOL/L — SIGNIFICANT CHANGE UP (ref 3.5–5.3)
RBC # BLD: 3.82 M/UL — LOW (ref 4.4–5.2)
RBC # FLD: 13.7 % — SIGNIFICANT CHANGE UP (ref 11–15.6)
SODIUM SERPL-SCNC: 142 MMOL/L — SIGNIFICANT CHANGE UP (ref 135–145)
WBC # BLD: 9.4 K/UL — SIGNIFICANT CHANGE UP (ref 4.8–10.8)
WBC # FLD AUTO: 9.4 K/UL — SIGNIFICANT CHANGE UP (ref 4.8–10.8)

## 2019-04-12 PROCEDURE — 88305 TISSUE EXAM BY PATHOLOGIST: CPT

## 2019-04-12 PROCEDURE — 88341 IMHCHEM/IMCYTCHM EA ADD ANTB: CPT

## 2019-04-12 PROCEDURE — 80048 BASIC METABOLIC PNL TOTAL CA: CPT

## 2019-04-12 PROCEDURE — 82962 GLUCOSE BLOOD TEST: CPT

## 2019-04-12 PROCEDURE — 88304 TISSUE EXAM BY PATHOLOGIST: CPT

## 2019-04-12 PROCEDURE — 88342 IMHCHEM/IMCYTCHM 1ST ANTB: CPT

## 2019-04-12 PROCEDURE — 36415 COLL VENOUS BLD VENIPUNCTURE: CPT

## 2019-04-12 PROCEDURE — 83735 ASSAY OF MAGNESIUM: CPT

## 2019-04-12 PROCEDURE — 85027 COMPLETE CBC AUTOMATED: CPT

## 2019-04-12 PROCEDURE — 80053 COMPREHEN METABOLIC PANEL: CPT

## 2019-04-12 PROCEDURE — 88112 CYTOPATH CELL ENHANCE TECH: CPT

## 2019-04-12 RX ORDER — SACCHAROMYCES BOULARDII 250 MG
1 POWDER IN PACKET (EA) ORAL
Qty: 14 | Refills: 0
Start: 2019-04-12 | End: 2019-04-18

## 2019-04-12 RX ADMIN — Medication 250 MILLIGRAM(S): at 06:22

## 2019-04-12 RX ADMIN — OXYCODONE AND ACETAMINOPHEN 2 TABLET(S): 5; 325 TABLET ORAL at 06:23

## 2019-04-12 RX ADMIN — Medication 500 MILLIGRAM(S): at 06:27

## 2019-04-12 RX ADMIN — OXYCODONE AND ACETAMINOPHEN 1 TABLET(S): 5; 325 TABLET ORAL at 12:23

## 2019-04-12 RX ADMIN — OXYCODONE AND ACETAMINOPHEN 1 TABLET(S): 5; 325 TABLET ORAL at 13:00

## 2019-04-12 RX ADMIN — Medication 500 MILLIGRAM(S): at 06:55

## 2019-04-12 RX ADMIN — ENOXAPARIN SODIUM 40 MILLIGRAM(S): 100 INJECTION SUBCUTANEOUS at 08:34

## 2019-04-12 RX ADMIN — OXYCODONE AND ACETAMINOPHEN 2 TABLET(S): 5; 325 TABLET ORAL at 06:55

## 2019-04-12 NOTE — DISCHARGE NOTE PROVIDER - NSDCFUADDAPPT_GEN_ALL_CORE_FT
Please follow-up with PA in one week for post-op visit/removal of sutures.  Follow-up with Dr. Alberto in two weeks to review pathology report.     Please contact your provider for any pain uncontrolled by medication, excessive bleeding or Fever>100.4  Please take Naprosyn 1 tablet every 12 hours x 3 days, may take percocet as prescribed for breakthrough pain.

## 2019-04-12 NOTE — DISCHARGE NOTE PROVIDER - HOSPITAL COURSE
Patient post-operatively had an uncomplicated hospital course. Her pain was well controlled. She is tolerating a regular diet. She is ambulating independently. She was able to void after removal of arreola. Patient with flatus. Labs and Vitals WNL upon discharge.

## 2019-04-12 NOTE — PROGRESS NOTE ADULT - ATTENDING COMMENTS
Patient seen and examined with  at bedside.  Continues to recover well.  Ambulating better.  Tolerating [employer].  Abdomen is soft and NT and ND with normal bowel sounds.  Anticipate discharge to home tomorrow.
patient is comfortable, complains of mild headaches probably due to percocet, labs and vitals are stable, plan to discharge home today after discussing with Dr villafana , discharge instruction discussed with patient in detail, all questions are answered
patient was seen and examined, pain is relatively controlled with PCA and Toradol , tolerating po fluids , will consider switching to po pain meds and dc iv fluids if was able to void, encourage ambulation , probiotics was started last night, dr villafana was updated .
patient was seen and examined, will d/c PCA . switch to percocet and naproxen, patient is tachycardia probably due to pain will monitor to see the need for more testing, patient declined blood testing because it hurts her hands patient was counseled about the need for blood test to evaluate her health and agreed to proceed, will update Dr villafana

## 2019-04-12 NOTE — DISCHARGE NOTE NURSING/CASE MANAGEMENT/SOCIAL WORK - NSDCDPATPORTLINK_GEN_ALL_CORE
You can access the [facility] Patient Portal, offered by [facility], by registering with the following website: [email or web address]

## 2019-04-12 NOTE — DISCHARGE NOTE PROVIDER - CARE PROVIDER_API CALL
Romeo Alberto)  Gynecologic Oncology; Obstetrics and Gynecology  [address] [place] [address]  Phone: [phone]  Fax: [phone]  Follow Up Time:

## 2019-04-12 NOTE — DISCHARGE NOTE PROVIDER - INSTRUCTIONS
Regular diet as tolerated.    May walk and climb stairs as often as you would like, no vigorous activity, do not lift anything greater than 10lbs, nothing per vagina x 6 weeks, do not drive while on pain medication.

## 2019-04-12 NOTE — PROGRESS NOTE ADULT - SUBJECTIVE AND OBJECTIVE BOX
GYNECOLOGIC ONCOLOGY PROGRESS NOTE    POD#3    PROBLEMS:  Ovarian cancer  Need for prophylactic measure      Pt seen and examined at bedside.     SUBJECTIVE:    Patient is with mild headache   Pain well-controlled.  Flatus: positive   Denies Nausea, Vomiting or Diarrhea.  Denies shortness of breath, chest pain or dyspnea on exertion.  Tolerating diet.    OBJECTIVE:     VITALS:  T(F): 98.8 (04-12-19 @ 08:45), Max: 98.9 (04-11-19 @ 15:48)  HR: 93 (04-12-19 @ 08:45) (93 - 109)  BP: 128/83 (04-12-19 @ 08:45) (101/70 - 128/83)  RR: 18 (04-12-19 @ 08:45) (18 - 20)  SpO2: 93% (04-12-19 @ 08:45) (93% - 100%)  Wt(kg): --    I&O's Summary    11 Apr 2019 07:01  -  12 Apr 2019 07:00  --------------------------------------------------------  IN: 550 mL / OUT: 0 mL / NET: 550 mL        MEDICATIONS  (STANDING):  enoxaparin Injectable 40 milliGRAM(s) SubCutaneous every 12 hours  LORazepam   Injectable 0.5 milliGRAM(s) IV Push once  naproxen 500 milliGRAM(s) Oral every 12 hours  saccharomyces boulardii 250 milliGRAM(s) Oral two times a day    MEDICATIONS  (PRN):  LORazepam     Tablet 0.5 milliGRAM(s) Oral every 6 hours PRN Anxiety  naloxone Injectable 0.1 milliGRAM(s) IV Push every 3 minutes PRN For ANY of the following changes in patient status:  A. RR LESS THAN 10 breaths per minute, B. Oxygen saturation LESS THAN 90%, C. Sedation score of 6  ondansetron    Tablet 4 milliGRAM(s) Oral every 6 hours PRN Nausea and/or Vomiting  ondansetron Injectable 4 milliGRAM(s) IV Push every 6 hours PRN Nausea  oxyCODONE    5 mG/acetaminophen 325 mG 1 Tablet(s) Oral every 4 hours PRN Moderate Pain (4 - 6)  oxyCODONE    5 mG/acetaminophen 325 mG 2 Tablet(s) Oral every 4 hours PRN Severe Pain (7 - 10)      Physical Exam:  Constitutional: NAD  Pulmonary: clear to auscultation bilaterally   Cardiovascular: Regular rate and rhythm   Abdomen: soft, non-tender, non-distended, normal bowel signs  Extremities: no lower extremity edema or calve tenderness, Christopher's sign negative.  Incision: Clean, dry, intact.  Without signs of infection or hernia.      LABS:                        11.1   9.4   )-----------( 224      ( 12 Apr 2019 07:28 )             33.2     04-12    142  |  106  |  6.0<L>  ----------------------------<  107  3.5   |  24.0  |  0.56    Ca    8.7      12 Apr 2019 07:28            RADIOLOGY & ADDITIONAL TESTS:

## 2019-04-15 ENCOUNTER — APPOINTMENT (OUTPATIENT)
Dept: GYNECOLOGIC ONCOLOGY | Facility: CLINIC | Age: 29
End: 2019-04-15
Payer: COMMERCIAL

## 2019-04-15 VITALS
SYSTOLIC BLOOD PRESSURE: 129 MMHG | DIASTOLIC BLOOD PRESSURE: 84 MMHG | OXYGEN SATURATION: 98 % | TEMPERATURE: 97.3 F | HEART RATE: 127 BPM | WEIGHT: 180 LBS | HEIGHT: 63 IN | BODY MASS INDEX: 31.89 KG/M2

## 2019-04-15 PROCEDURE — 99024 POSTOP FOLLOW-UP VISIT: CPT

## 2019-04-15 NOTE — ASSESSMENT
[FreeTextEntry1] : Discussed with patient that I feel she is not controlling her pain as well as she should be. I recommended she take percocet for the pain as well as Aleve twice daily. Pt insists she only wants to take the percocet at night and overall does not like the way it makes her feel. If she only takes percocet at night, then she can take Tylenol XS during the day-pt made aware not to exceed 3500-4g daily.

## 2019-04-15 NOTE — REASON FOR VISIT
[Post Op] : post op visit [de-identified] : 4/9/19 [de-identified] : E-lap, LSO, omentectomy, bilateral PPALND, multiple peritoneal biopsies [de-identified] : Pt complains of incisional pains 6/10. She is taking 1/2 percocet 1-2 times a day because she doesn't like the way it makes her feel. She has some nausea and is able to eat. Denies vomiting. Denies fever, cp, sob or calf pain. She has not had a BM but is passing gas. Pt states she gets frequent UTI's and wants to make sure she doesn't have a UTI-she feels she is not emptying bladder completely.

## 2019-04-15 NOTE — DISCUSSION/SUMMARY
[Clean] : was clean [Dry] : was dry [Intact] : was intact [None] : had no drainage [Staple Intact] : had staples intact [Normal Skin] : normal appearance [Normal Skin Turgor] : normal skin turgor [Tender] : tender [Doing Well] : is doing well [No Sign of Infection] : is showing no signs of infection [Remove Sutures/Staples] : remove sutures/staples [Fair Pain Control] : has fair pain control [Clinical Recheck] : clinical recheck [Erythema] : was not erythematous [Abnormal Bowel Sounds] : normal bowel sounds [Firm] : soft [Ecchymosis] : was not ecchymotic [Rebound] : no rebound tenderness [Guarding] : no guarding [de-identified] : Lungs-CTA b/l, CV-NSR, S1 S2  [de-identified] : U/A-small-mod blood

## 2019-04-17 LAB — BACTERIA UR CULT: NORMAL

## 2019-04-18 ENCOUNTER — APPOINTMENT (OUTPATIENT)
Dept: GYNECOLOGIC ONCOLOGY | Facility: CLINIC | Age: 29
End: 2019-04-18
Payer: COMMERCIAL

## 2019-04-18 VITALS
SYSTOLIC BLOOD PRESSURE: 139 MMHG | DIASTOLIC BLOOD PRESSURE: 84 MMHG | HEIGHT: 63 IN | HEART RATE: 115 BPM | OXYGEN SATURATION: 98 % | RESPIRATION RATE: 16 BRPM

## 2019-04-18 DIAGNOSIS — Z98.890 OTHER SPECIFIED POSTPROCEDURAL STATES: ICD-10-CM

## 2019-04-18 PROCEDURE — 99024 POSTOP FOLLOW-UP VISIT: CPT

## 2019-04-18 NOTE — DISCUSSION/SUMMARY
[Clean] : was clean [Dry] : was dry [Intact] : was intact [Staple Intact] : had staples intact [None] : had no drainage [Normal Skin] : normal appearance [Normal Skin Turgor] : normal skin turgor [Doing Well] : is doing well [Excellent Pain Control] : has excellent pain control [No Sign of Infection] : is showing no signs of infection [Remove Sutures/Staples] : remove sutures/staples [Clinical Recheck] : clinical recheck [Erythema] : was not erythematous [Ecchymosis] : was not ecchymotic [Firm] : soft [Tender] : nontender [Abnormal Bowel Sounds] : normal bowel sounds [de-identified] : Lungs-CTA b/l, CV-NSR, S1 S2 [de-identified] : removed every other staple

## 2019-04-18 NOTE — REASON FOR VISIT
[Post Op] : post op visit [de-identified] : 4/9/19 [de-identified] : E-lap, LSO, omentectomy, bilateral PPALND, multiple peritoneal biopsies [de-identified] : Pt returns today for staple removal. She feels significantly better from a few days ago. She reports that her pains have improved. Denies bleeding. Denies cp, sob or calf pain. She is on lovenox twice daily.

## 2019-04-19 LAB — NON-GYNECOLOGICAL CYTOLOGY STUDY: SIGNIFICANT CHANGE UP

## 2019-04-22 ENCOUNTER — APPOINTMENT (OUTPATIENT)
Dept: GYNECOLOGIC ONCOLOGY | Facility: CLINIC | Age: 29
End: 2019-04-22
Payer: COMMERCIAL

## 2019-04-22 VITALS
BODY MASS INDEX: 31.89 KG/M2 | DIASTOLIC BLOOD PRESSURE: 85 MMHG | RESPIRATION RATE: 16 BRPM | HEART RATE: 120 BPM | WEIGHT: 180 LBS | HEIGHT: 63 IN | OXYGEN SATURATION: 96 % | SYSTOLIC BLOOD PRESSURE: 132 MMHG

## 2019-04-22 LAB — SURGICAL PATHOLOGY STUDY: SIGNIFICANT CHANGE UP

## 2019-04-22 PROCEDURE — 99024 POSTOP FOLLOW-UP VISIT: CPT

## 2019-04-22 NOTE — REASON FOR VISIT
[Post Op] : post op visit [Spouse] : spouse [de-identified] : exploratory laparotomy, LSO, omentectomy, bilateral pelvic and para-aortic lymph node dissection, multiple peritoneal biopsies for staging for ovarian tumor (at least borderline) at [facility]  [de-identified] : 4/9/19

## 2019-04-22 NOTE — DISCUSSION/SUMMARY
[Clean] : was clean [Dry] : was dry [Intact] : was intact [None] : had no drainage [Normal Skin] : normal appearance [Doing Well] : is doing well [Excellent Pain Control] : has excellent pain control [No Sign of Infection] : is showing no signs of infection [Findings] : These findings were discussed with [unfilled] in detail. She understood and accepted the rationale for this recommendation and also understood the serious impact that these findings could have upon her prognosis for survival. [Erythema] : was not erythematous [Ecchymosis] : was not ecchymotic [Seroma] : had no seroma [Firm] : soft [Tender] : nontender [Abnormal Bowel Sounds] : normal bowel sounds [Rebound] : no rebound tenderness [Guarding] : no guarding [Incisional Hernia] : no incisional hernia [Mass] : no palpable mass [External Genitalia Abnormal] : normal external genitalia [Vaginal Exam Abnormal] : normal vaginal exam [FreeTextEntry1] : Pertinent finding showed a 10cm left ovarian neoplasm involving the entire ovary which was not adherent to surrounding structures and removed intact. No gross evidence of a metastatic process. Normal upper abdomen. No lymphadenopathy. Normal appendix. 100cc of straw colored ascites sent to cytology.

## 2019-04-23 ENCOUNTER — LABORATORY RESULT (OUTPATIENT)
Age: 29
End: 2019-04-23

## 2019-04-29 NOTE — END OF VISIT
[FreeTextEntry3] : Written by Roslyn Lockhart, acting as a scribe for Dr. Romeo Alberto \par This note accurately reflects the work and decisions made by me.

## 2019-04-29 NOTE — HISTORY OF PRESENT ILLNESS
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

## 2019-04-29 NOTE — REASON FOR VISIT
[FreeTextEntry1] : [place] Location \par \par [facility] [place]. [phone]\par [address] [place] [address]

## 2019-04-29 NOTE — ASSESSMENT
[FreeTextEntry1] : I discussed the risk and reviewed recovery time for an Exploratory Left Ovarian Cystectomy possible Left salpingo-oophorectomy and staging. I advised the patient that removing her fallopian tube will not effect her fertility but if in the future she struggles to get pregnant she should see an IVF specialist. Patient stated she understood and agreed to comply.

## 2019-04-29 NOTE — PHYSICAL EXAM
[Normal] : No focal neurologic defects observed [Fully active, able to carry on all pre-disease performance without restriction] : Status 0 - Fully active, able to carry on all pre-disease performance without restriction [de-identified] : Roslyn Lockhart Medical assistant chaperoned during gynecologic exam.

## 2019-05-30 LAB — CANCER AG125 SERPL-ACNC: 27 U/ML

## 2019-06-06 ENCOUNTER — APPOINTMENT (OUTPATIENT)
Dept: GYNECOLOGIC ONCOLOGY | Facility: CLINIC | Age: 29
End: 2019-06-06
Payer: COMMERCIAL

## 2019-06-06 VITALS
RESPIRATION RATE: 16 BRPM | BODY MASS INDEX: 31.89 KG/M2 | SYSTOLIC BLOOD PRESSURE: 137 MMHG | DIASTOLIC BLOOD PRESSURE: 84 MMHG | WEIGHT: 180 LBS | HEIGHT: 63 IN | OXYGEN SATURATION: 98 % | HEART RATE: 95 BPM

## 2019-06-06 PROCEDURE — 99024 POSTOP FOLLOW-UP VISIT: CPT

## 2019-06-11 NOTE — REASON FOR VISIT
[Spouse] : spouse [FreeTextEntry1] : [facility]\par \par [facility] [phone] at [address] [place] [phone]\par

## 2019-06-11 NOTE — HISTORY OF PRESENT ILLNESS
[FreeTextEntry1] : 30 y/o s/p ex-laparotomy, LSO, omentectomy, bilateral pelvic and para-aortic lymph node dissection with multiple peritoneal biopsies for staging of an ovarian tumor (at least borderline) on 4/9/19. I sent her slides to [facility] for a second opinion and ordered a ca-125 and asked the patient to return today to review.  Ca-125 from 5/29/19 was 27 (previously 657 pre-operatively). 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 [facility]’s opinion agreed with initial diagnosis of serous borderline tumor. Patient feels well from a gynecological stand point. She denies pain or VB.

## 2019-06-11 NOTE — PHYSICAL EXAM
[Normal] : No focal neurologic defects observed [de-identified] : [facility]

## 2019-08-02 ENCOUNTER — TRANSCRIPTION ENCOUNTER (OUTPATIENT)
Age: 29
End: 2019-08-02

## 2019-08-27 ENCOUNTER — TRANSCRIPTION ENCOUNTER (OUTPATIENT)
Age: 29
End: 2019-08-27

## 2019-08-29 ENCOUNTER — OTHER (OUTPATIENT)
Age: 29
End: 2019-08-29

## 2019-09-03 ENCOUNTER — RESULT REVIEW (OUTPATIENT)
Age: 29
End: 2019-09-03

## 2019-09-03 LAB — CANCER AG125 SERPL-ACNC: 18 U/ML

## 2019-09-05 ENCOUNTER — APPOINTMENT (OUTPATIENT)
Dept: GYNECOLOGIC ONCOLOGY | Facility: CLINIC | Age: 29
End: 2019-09-05
Payer: COMMERCIAL

## 2019-09-05 PROCEDURE — 76830 TRANSVAGINAL US NON-OB: CPT | Mod: 59

## 2019-09-05 PROCEDURE — 76857 US EXAM PELVIC LIMITED: CPT | Mod: 59

## 2019-09-05 PROCEDURE — 99214 OFFICE O/P EST MOD 30 MIN: CPT | Mod: 25

## 2019-09-09 NOTE — REASON FOR VISIT
[FreeTextEntry1] : [facility]\par \par [facility] [phone] at [address] [place] [phone]\par

## 2019-09-09 NOTE — HISTORY OF PRESENT ILLNESS
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 Ca-125 from 09/03/19 was 18. She feels well from a gynecological stand point. She denies pain or abnormal vaginal bleeding. Patient and  express possible desire to try and conceive.

## 2019-09-09 NOTE — ASSESSMENT
[FreeTextEntry1] : Ultrasound from today is normal \par \par I explained that given her normal ultrasound and tumor markers now would be a good time to try and start a family. It is unknown if she will ever require surgical intervention that would possibly jeopardize her chances of conceiving in the future.

## 2019-09-09 NOTE — PHYSICAL EXAM
[Normal] : No focal neurologic defects observed [de-identified] : Radha Schuster MA was present the entire time of ultrasound results and discussion

## 2019-09-15 ENCOUNTER — TRANSCRIPTION ENCOUNTER (OUTPATIENT)
Age: 29
End: 2019-09-15

## 2019-12-13 LAB — CANCER AG125 SERPL-ACNC: 17 U/ML

## 2019-12-20 ENCOUNTER — APPOINTMENT (OUTPATIENT)
Dept: GYNECOLOGIC ONCOLOGY | Facility: CLINIC | Age: 29
End: 2019-12-20
Payer: COMMERCIAL

## 2019-12-20 VITALS
RESPIRATION RATE: 16 BRPM | HEIGHT: 63 IN | HEART RATE: 108 BPM | OXYGEN SATURATION: 100 % | SYSTOLIC BLOOD PRESSURE: 130 MMHG | BODY MASS INDEX: 31.89 KG/M2 | WEIGHT: 180 LBS | DIASTOLIC BLOOD PRESSURE: 88 MMHG

## 2019-12-20 DIAGNOSIS — N83.8 OTHER NONINFLAMMATORY DISORDERS OF OVARY, FALLOPIAN TUBE AND BROAD LIGAMENT: ICD-10-CM

## 2019-12-20 PROCEDURE — 93976 VASCULAR STUDY: CPT | Mod: 59

## 2019-12-20 PROCEDURE — 99214 OFFICE O/P EST MOD 30 MIN: CPT | Mod: 25

## 2019-12-20 PROCEDURE — 76830 TRANSVAGINAL US NON-OB: CPT | Mod: 59

## 2020-01-07 NOTE — PHYSICAL EXAM
[Normal] : Mood and affect: Normal [de-identified] : Radha Massey MA was present the entire time of results and discussion

## 2020-01-07 NOTE — HISTORY OF PRESENT ILLNESS
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 Ca-125 from 12/2019 was 17 which she is stable.

## 2020-01-07 NOTE — END OF VISIT
[FreeTextEntry3] : Written by Radha Massey, acting as a scribe for Dr. Romeo Alberto.\par This note accurately reflects the work and decisions made by me\par

## 2020-01-07 NOTE — REASON FOR VISIT
[FreeTextEntry1] : [facility]\par \par [facility] [phone] at [address] [place] [phone]\par

## 2020-01-09 ENCOUNTER — TRANSCRIPTION ENCOUNTER (OUTPATIENT)
Age: 30
End: 2020-01-09

## 2020-03-04 ENCOUNTER — TRANSCRIPTION ENCOUNTER (OUTPATIENT)
Age: 30
End: 2020-03-04

## 2020-03-17 LAB — CANCER AG125 SERPL-ACNC: 18 U/ML

## 2020-03-23 ENCOUNTER — APPOINTMENT (OUTPATIENT)
Dept: GYNECOLOGIC ONCOLOGY | Facility: CLINIC | Age: 30
End: 2020-03-23
Payer: COMMERCIAL

## 2020-03-23 PROCEDURE — 76830 TRANSVAGINAL US NON-OB: CPT | Mod: 59

## 2020-03-23 PROCEDURE — 76857 US EXAM PELVIC LIMITED: CPT | Mod: 59

## 2020-03-23 PROCEDURE — 99214 OFFICE O/P EST MOD 30 MIN: CPT | Mod: 25

## 2020-03-23 NOTE — REASON FOR VISIT
[Spouse] : spouse [FreeTextEntry1] : [facility]\par \par [facility] [phone] at [address] [place] [phone]\par

## 2020-03-23 NOTE — PHYSICAL EXAM
[Normal] : No focal neurologic defects observed [de-identified] : Radha Massey MA was present the entire time of results and discussion

## 2020-03-23 NOTE — HISTORY OF PRESENT ILLNESS
[FreeTextEntry1] : 31 y/o with a history of serous borderline tumor of the ovary since 04/2019 s/p exploratory laparotomy, LSO, omentectomy, bilateral pelvic and para-aortic lymph node dissection, multiple peritoneal biopsies presents today for a three month follow up US. 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 Ca125 from 03/2020 was 18 which is stable. She reports since her last visit she noticed her menstrual cycles were either coming a week late or a week early. Patient is trying to get pregnant.

## 2020-03-23 NOTE — ASSESSMENT
[FreeTextEntry1] : Ultrasound from today showed a 1.3x0.9 cm endometrial polyp. Right ovary WNL. \par \par Discussed with patient that she may have an endometrial polyp that is causing her menstrual cycles to be unpredictable. Otherwise her exam is normal. I would like to observe her next couple of cycles if they continue to be unpredictable will discuss possible D&C. Patient will return to my office in June for a follow up sonogram.

## 2020-06-15 LAB — CANCER AG125 SERPL-ACNC: 20 U/ML

## 2020-06-19 ENCOUNTER — APPOINTMENT (OUTPATIENT)
Dept: GYNECOLOGIC ONCOLOGY | Facility: CLINIC | Age: 30
End: 2020-06-19
Payer: COMMERCIAL

## 2020-06-19 VITALS — BODY MASS INDEX: 34.02 KG/M2 | WEIGHT: 192 LBS | HEIGHT: 63 IN

## 2020-06-19 PROCEDURE — 76830 TRANSVAGINAL US NON-OB: CPT | Mod: 59

## 2020-06-19 PROCEDURE — 76857 US EXAM PELVIC LIMITED: CPT | Mod: 59

## 2020-06-19 PROCEDURE — 99214 OFFICE O/P EST MOD 30 MIN: CPT | Mod: 25

## 2020-06-19 NOTE — ASSESSMENT
[FreeTextEntry1] : Ultrasound from today showed- 15 mm endo lining, polyp still seen in endo. 2.7 cm RTO simple cyst. (left ovary surgically absent)\par \par Discussed with patient and her  that from a cancer stand point she is doing great. She has an endometrial polyp seen on US that is likely the cause of her irregular cycles. I am recommending a D&C hysteroscopy possible myosure to remove the polyp with the intentions that her cycles will regulate. I recommend she holds off on trying to conceive at this time until after the D&C. Will have HCG checked prior to surgery.

## 2020-06-19 NOTE — REASON FOR VISIT
[Spouse] : spouse [FreeTextEntry1] : [facility]\par \par [facility] [phone] at [address] [place] [phone]\par

## 2020-06-19 NOTE — HISTORY OF PRESENT ILLNESS
[FreeTextEntry1] : 31 y/o LMP 05/29/20 with a history of serous borderline tumor of the ovary since 04/2019  s/p exploratory laparotomy, LSO, omentectomy, bilateral pelvic and para-aortic lymph node dissection, multiple peritoneal biopsies presents today for a three month follow up US. 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 US from 03/23/20 showed a 1.3x0.9 cm endometrial polyp, normal right ovary.  Ca-125 from 6/2020 was 20 which is stable. Patient admits to irregular menstrual cycles. She admits to actively trying to get pregnant now.

## 2020-06-19 NOTE — PHYSICAL EXAM
[Normal] : No focal neurologic defects observed [de-identified] : Radha Massey MA was present the entire time of gynecological exam.

## 2020-07-06 ENCOUNTER — OUTPATIENT (OUTPATIENT)
Dept: OUTPATIENT SERVICES | Facility: HOSPITAL | Age: 30
LOS: 1 days | End: 2020-07-06
Payer: COMMERCIAL

## 2020-07-06 VITALS
RESPIRATION RATE: 16 BRPM | HEART RATE: 100 BPM | TEMPERATURE: 98 F | DIASTOLIC BLOOD PRESSURE: 74 MMHG | WEIGHT: 264.55 LBS | SYSTOLIC BLOOD PRESSURE: 120 MMHG | HEIGHT: 63 IN

## 2020-07-06 DIAGNOSIS — Z01.818 ENCOUNTER FOR OTHER PREPROCEDURAL EXAMINATION: ICD-10-CM

## 2020-07-06 DIAGNOSIS — N84.0 POLYP OF CORPUS UTERI: ICD-10-CM

## 2020-07-06 DIAGNOSIS — Z90.721 ACQUIRED ABSENCE OF OVARIES, UNILATERAL: Chronic | ICD-10-CM

## 2020-07-06 DIAGNOSIS — N36.9 URETHRAL DISORDER, UNSPECIFIED: Chronic | ICD-10-CM

## 2020-07-06 DIAGNOSIS — Z29.9 ENCOUNTER FOR PROPHYLACTIC MEASURES, UNSPECIFIED: ICD-10-CM

## 2020-07-06 DIAGNOSIS — Z98.890 OTHER SPECIFIED POSTPROCEDURAL STATES: Chronic | ICD-10-CM

## 2020-07-06 LAB
A1C WITH ESTIMATED AVERAGE GLUCOSE RESULT: 5.4 % — SIGNIFICANT CHANGE UP (ref 4–5.6)
ANION GAP SERPL CALC-SCNC: 14 MMOL/L — SIGNIFICANT CHANGE UP (ref 5–17)
APTT BLD: 30.1 SEC — SIGNIFICANT CHANGE UP (ref 27.5–35.5)
BASOPHILS # BLD AUTO: 0.04 K/UL — SIGNIFICANT CHANGE UP (ref 0–0.2)
BASOPHILS NFR BLD AUTO: 0.4 % — SIGNIFICANT CHANGE UP (ref 0–2)
BUN SERPL-MCNC: 13 MG/DL — SIGNIFICANT CHANGE UP (ref 8–20)
CALCIUM SERPL-MCNC: 9.3 MG/DL — SIGNIFICANT CHANGE UP (ref 8.6–10.2)
CHLORIDE SERPL-SCNC: 100 MMOL/L — SIGNIFICANT CHANGE UP (ref 98–107)
CO2 SERPL-SCNC: 24 MMOL/L — SIGNIFICANT CHANGE UP (ref 22–29)
CREAT SERPL-MCNC: 0.84 MG/DL — SIGNIFICANT CHANGE UP (ref 0.5–1.3)
EOSINOPHIL # BLD AUTO: 0.08 K/UL — SIGNIFICANT CHANGE UP (ref 0–0.5)
EOSINOPHIL NFR BLD AUTO: 0.7 % — SIGNIFICANT CHANGE UP (ref 0–6)
ESTIMATED AVERAGE GLUCOSE: 108 MG/DL — SIGNIFICANT CHANGE UP (ref 68–114)
GLUCOSE SERPL-MCNC: 89 MG/DL — SIGNIFICANT CHANGE UP (ref 70–99)
HCG SERPL-ACNC: <4 MIU/ML — SIGNIFICANT CHANGE UP
HCT VFR BLD CALC: 40.2 % — SIGNIFICANT CHANGE UP (ref 34.5–45)
HGB BLD-MCNC: 13.4 G/DL — SIGNIFICANT CHANGE UP (ref 11.5–15.5)
IMM GRANULOCYTES NFR BLD AUTO: 0.3 % — SIGNIFICANT CHANGE UP (ref 0–1.5)
INR BLD: 1.08 RATIO — SIGNIFICANT CHANGE UP (ref 0.88–1.16)
LYMPHOCYTES # BLD AUTO: 2.51 K/UL — SIGNIFICANT CHANGE UP (ref 1–3.3)
LYMPHOCYTES # BLD AUTO: 22.1 % — SIGNIFICANT CHANGE UP (ref 13–44)
MCHC RBC-ENTMCNC: 28.5 PG — SIGNIFICANT CHANGE UP (ref 27–34)
MCHC RBC-ENTMCNC: 33.3 GM/DL — SIGNIFICANT CHANGE UP (ref 32–36)
MCV RBC AUTO: 85.5 FL — SIGNIFICANT CHANGE UP (ref 80–100)
MONOCYTES # BLD AUTO: 0.7 K/UL — SIGNIFICANT CHANGE UP (ref 0–0.9)
MONOCYTES NFR BLD AUTO: 6.2 % — SIGNIFICANT CHANGE UP (ref 2–14)
NEUTROPHILS # BLD AUTO: 7.99 K/UL — HIGH (ref 1.8–7.4)
NEUTROPHILS NFR BLD AUTO: 70.3 % — SIGNIFICANT CHANGE UP (ref 43–77)
PLATELET # BLD AUTO: 328 K/UL — SIGNIFICANT CHANGE UP (ref 150–400)
POTASSIUM SERPL-MCNC: 3.9 MMOL/L — SIGNIFICANT CHANGE UP (ref 3.5–5.3)
POTASSIUM SERPL-SCNC: 3.9 MMOL/L — SIGNIFICANT CHANGE UP (ref 3.5–5.3)
PROTHROM AB SERPL-ACNC: 12.5 SEC — SIGNIFICANT CHANGE UP (ref 10.6–13.6)
RBC # BLD: 4.7 M/UL — SIGNIFICANT CHANGE UP (ref 3.8–5.2)
RBC # FLD: 13.2 % — SIGNIFICANT CHANGE UP (ref 10.3–14.5)
SODIUM SERPL-SCNC: 138 MMOL/L — SIGNIFICANT CHANGE UP (ref 135–145)
WBC # BLD: 11.35 K/UL — HIGH (ref 3.8–10.5)
WBC # FLD AUTO: 11.35 K/UL — HIGH (ref 3.8–10.5)

## 2020-07-06 PROCEDURE — 83036 HEMOGLOBIN GLYCOSYLATED A1C: CPT

## 2020-07-06 PROCEDURE — 85027 COMPLETE CBC AUTOMATED: CPT

## 2020-07-06 PROCEDURE — G0463: CPT

## 2020-07-06 PROCEDURE — 80048 BASIC METABOLIC PNL TOTAL CA: CPT

## 2020-07-06 PROCEDURE — 84702 CHORIONIC GONADOTROPIN TEST: CPT

## 2020-07-06 PROCEDURE — 85610 PROTHROMBIN TIME: CPT

## 2020-07-06 PROCEDURE — 85730 THROMBOPLASTIN TIME PARTIAL: CPT

## 2020-07-06 PROCEDURE — 36415 COLL VENOUS BLD VENIPUNCTURE: CPT

## 2020-07-06 NOTE — H&P PST ADULT - HISTORY OF PRESENT ILLNESS
30 year old nulligravida female present for PST. She was recently seen in April after she had diagnostic laparoscopy with left ovarian mass biopsies with strong suspicion for borderline tumor  subsequently, she underwent exploratory laparotomy left ovarian salpingo oophorectomy. She was also noted to have endometrial polyp during work up and is now schedule for D & C hysteroscopy possible myosure.

## 2020-07-06 NOTE — H&P PST ADULT - NSICDXPASTSURGICALHX_GEN_ALL_CORE_FT
PAST SURGICAL HISTORY:  History of left oophorectomy     S/P laparoscopic surgery laparoscopy with left ovarian mass biopsies    Urethral disorder urethral dilation  at 20 yr old for cystitis  chronic  UTI "S   no problem since then

## 2020-07-06 NOTE — H&P PST ADULT - NSICDXPROBLEM_GEN_ALL_CORE_FT
PROBLEM DIAGNOSES  Problem: Endometrial polyp  Assessment and Plan:  D & C hysteroscopy possible myosure.    Problem: Need for prophylactic measure  Assessment and Plan: low risk the surgical team will order appropriate VTE prophylaxis

## 2020-07-06 NOTE — H&P PST ADULT - NEGATIVE CARDIOVASCULAR SYMPTOMS
no peripheral edema/no dyspnea on exertion/no claudication/no chest pain/no palpitations/no paroxysmal nocturnal dyspnea/no orthopnea

## 2020-07-08 DIAGNOSIS — Z01.818 ENCOUNTER FOR OTHER PREPROCEDURAL EXAMINATION: ICD-10-CM

## 2020-07-10 ENCOUNTER — APPOINTMENT (OUTPATIENT)
Dept: DISASTER EMERGENCY | Facility: CLINIC | Age: 30
End: 2020-07-10

## 2020-07-11 LAB — SARS-COV-2 N GENE NPH QL NAA+PROBE: NOT DETECTED

## 2020-07-12 NOTE — H&P PST ADULT - ASSESSMENT
Admission Reconciliation is Completed  Discharge Reconciliation is Completed Pleasant 28 yr old female in [facility]  presents with c/o left ovarian cyst . Scheduled for left ovarian cystectomy .  CAPRINI VTE 2.0 SCORE [CLOT updated 2019]    AGE RELATED RISK FACTORS                                                       MOBILITY RELATED FACTORS  [ ] Age 41-60 years                                            (1 Point)                    [ ] Bed rest                                                        (1 Point)  [ ] Age: 61-74 years                                           (2 Points)                  [ ] Plaster cast                                                   (2 Points)  [ ] Age= 75 years                                              (3 Points)                    [ ] Bed bound for more than 72 hours                 (2 Points)    DISEASE RELATED RISK FACTORS                                               GENDER SPECIFIC FACTORS  [ ] Edema in the lower extremities                       (1 Point)              [ ] Pregnancy                                                     (1 Point)  [ ] Varicose veins                                               (1 Point)                     [ ] Post-partum < 6 weeks                                   (1 Point)             [ ] BMI > 25 Kg/m2                                            (1 Point)                     [ ] Hormonal therapy  or oral contraception          (1 Point)                 [ ] Sepsis (in the previous month)                        (1 Point)               [ ] History of pregnancy complications                 (1 point)  [ ] Pneumonia or serious lung disease                                               [ ] Unexplained or recurrent                     (1 Point)           (in the previous month)                               (1 Point)  [ ] Abnormal pulmonary function test                     (1 Point)                 SURGERY RELATED RISK FACTORS  [ ] Acute myocardial infarction                              (1 Point)               [ ]  Section                                             (1 Point)  [ ] Congestive heart failure (in the previous month)  (1 Point)      [ ] Minor surgery                                                  (1 Point)   [ ] Inflammatory bowel disease                             (1 Point)               [ ] Arthroscopic surgery                                        (2 Points)  [ ] Central venous access                                      (2 Points)                [X ]X General surgery lasting more than 45 minutes (2 points)  [ ] Malignancy- Present or previous                   (2 Points)                [ ] Elective arthroplasty                                         (5 points)    [ ] Stroke (in the previous month)                          (5 Points)                                                                                                                                                           HEMATOLOGY RELATED FACTORS                                                 TRAUMA RELATED RISK FACTORS  [ ] Prior episodes of VTE                                     (3 Points)                [ ] Fracture of the hip, pelvis, or leg                       (5 Points)  [ ] Positive family history for VTE                         (3 Points)             [ ] Acute spinal cord injury (in the previous month)  (5 Points)  [ ] Prothrombin 17784 A                                     (3 Points)               [ ] Paralysis  (less than 1 month)                             (5 Points)  [ ] Factor V Leiden                                             (3 Points)                  [ ] Multiple Trauma within 1 month                        (5 Points)  [ ] Lupus anticoagulants                                     (3 Points)                                                           [ ] Anticardiolipin antibodies                               (3 Points)                                                       [ ] High homocysteine in the blood                      (3 Points)                                             [ ] Other congenital or acquired thrombophilia      (3 Points)                                                [ ] Heparin induced thrombocytopenia                  (3 Points)                                     Total Score [      2    ]  OPIOID RISK TOOL    THUAN EACH BOX THAT APPLIES AND ADD TOTALS AT THE END    FAMILY HISTORY OF SUBSTANCE ABUSE                 FEMALE         MALE                                                Alcohol                             [  ]1 pt          [  ]3pts                                               Illegal Durgs                     [  ]2 pts        [  ]3pts                                               Rx Drugs                           [  ]4 pts        [  ]4 pts    PERSONAL HISTORY OF SUBSTANCE ABUSE                                                                                          Alcohol                             [  ]3 pts       [  ]3 pts                                               Illegal Durgs                     [ X ]4 pts        [  ]4 pts                                               Rx Drugs                           [  ]5 pts        [  ]5 pts    AGE BETWEEN 16-45 YEARS                                      [ X ]1 pt         [  ]1 pt    HISTORY OF PREADOLESCENT   SEXUAL ABUSE                                                             [  ]3 pts        [  ]0pts    PSYCHOLOGICAL DISEASE                     ADD, OCD, Bipolar, Schizophrenia        [  ]2 pts         [  ]2 pts                      Depression                                               [  ]1 pt           [  ]1 pt           SCORING TOTAL   (add numbers and type here)              (**5*)                                     A score of 3 or lower indicated LOW risk for future opiod abuse  A score of 4 to 7 indicated moderate risk for future opiod abuse  A score of 8 or higher indicates a high risk for opiod abuse

## 2020-07-13 ENCOUNTER — RESULT REVIEW (OUTPATIENT)
Age: 30
End: 2020-07-13

## 2020-07-21 DIAGNOSIS — R19.7 DIARRHEA, UNSPECIFIED: ICD-10-CM

## 2020-07-22 ENCOUNTER — APPOINTMENT (OUTPATIENT)
Dept: GYNECOLOGIC ONCOLOGY | Facility: CLINIC | Age: 30
End: 2020-07-22
Payer: COMMERCIAL

## 2020-07-22 DIAGNOSIS — N93.9 ABNORMAL UTERINE AND VAGINAL BLEEDING, UNSPECIFIED: ICD-10-CM

## 2020-07-22 LAB
BASOPHILS # BLD AUTO: 0.04 K/UL
BASOPHILS NFR BLD AUTO: 0.3 %
EOSINOPHIL # BLD AUTO: 0.1 K/UL
EOSINOPHIL NFR BLD AUTO: 0.9 %
HCT VFR BLD CALC: 40.9 %
HGB BLD-MCNC: 12.9 G/DL
IMM GRANULOCYTES NFR BLD AUTO: 0.5 %
LYMPHOCYTES # BLD AUTO: 2.68 K/UL
LYMPHOCYTES NFR BLD AUTO: 22.8 %
MAN DIFF?: NORMAL
MCHC RBC-ENTMCNC: 28.2 PG
MCHC RBC-ENTMCNC: 31.5 GM/DL
MCV RBC AUTO: 89.3 FL
MONOCYTES # BLD AUTO: 0.89 K/UL
MONOCYTES NFR BLD AUTO: 7.6 %
NEUTROPHILS # BLD AUTO: 7.97 K/UL
NEUTROPHILS NFR BLD AUTO: 67.9 %
PLATELET # BLD AUTO: 312 K/UL
RBC # BLD: 4.58 M/UL
RBC # FLD: 13.8 %
WBC # FLD AUTO: 11.74 K/UL

## 2020-07-22 PROCEDURE — 99213 OFFICE O/P EST LOW 20 MIN: CPT

## 2020-07-22 NOTE — REASON FOR VISIT
[Post Op] : post op visit [de-identified] : 7/13/20 [de-identified] : EUA, dilation and curettage, diagnostic hysteroscopy for thickened endometrium, irregular vaginal bleeding, history of borderline ovarian tumor. Surgery was performed on 7/13/20 at [facility]  [de-identified] : Patient contacted the office yesterday with complaints of heavy vaginal bleeding. Unsure if she is having a menstrual cycle or if the bleeding is from her procedure. She denies fever, SOB, or calf pain. CBC was ordered by my PA to rule out anemia. H/H was 12/.9/40.9.  Patient's last Ca-125 from 6/2020 was stable at 20. She is due for her next SVL US in September.

## 2020-07-22 NOTE — DISCUSSION/SUMMARY
[Doing Well] : is doing well [Excellent Pain Control] : has excellent pain control [No Sign of Infection] : is showing no signs of infection [Findings] : These findings were discussed with [unfilled] in detail. She understood and accepted the rationale for this recommendation and also understood the serious impact that these findings could have upon her prognosis for survival. [Cervical Abnormality] : normal cervix [External Genitalia Abnormal] : normal external genitalia [Vaginal Exam Abnormal] : normal vaginal exam [FreeTextEntry1] : Pertinent findings- normal external female genitalia, BUS WNL, normal vagina and cervix, small anteverted uterus without adnexal masses, normal endometrium. No obvious fibroids, polyps, or endometrial pathology. \par \par Final pathology- [facility]- secretory endometrium, endometrial polyp.

## 2020-09-15 LAB — CANCER AG125 SERPL-ACNC: 22 U/ML

## 2020-09-18 ENCOUNTER — APPOINTMENT (OUTPATIENT)
Dept: GYNECOLOGIC ONCOLOGY | Facility: CLINIC | Age: 30
End: 2020-09-18
Payer: COMMERCIAL

## 2020-09-18 PROCEDURE — 76857 US EXAM PELVIC LIMITED: CPT | Mod: 59

## 2020-09-18 PROCEDURE — 99214 OFFICE O/P EST MOD 30 MIN: CPT | Mod: 25

## 2020-09-18 PROCEDURE — 76830 TRANSVAGINAL US NON-OB: CPT | Mod: 59

## 2020-09-28 ENCOUNTER — RESULT REVIEW (OUTPATIENT)
Age: 30
End: 2020-09-28

## 2020-09-28 NOTE — PHYSICAL EXAM
[Normal] : No focal neurologic defects observed [de-identified] : Roslyn Lockhart Medical assistant chaperoned during results and discussion.

## 2020-09-28 NOTE — REASON FOR VISIT
[Spouse] : spouse [FreeTextEntry1] : [place] Location \par \par [facility] [place]. [phone]\par [address] [place] [address]

## 2020-09-28 NOTE — ASSESSMENT
[FreeTextEntry1] : Ultrasound performed in the office today remains stable. \par \par I discussed with patient and her  that her ultrasound today was stable and reassuring. I explained that patients blood test has gone up a few numbers since March but understands that as long as it is not doubling at this time it is unlikely anything worrisome.  I advised them that she may start trying to conceive at this time. Patient will return in December for a follow up ultrasound with a repeat Ca125 prior to next visit. I advised the patient that if she becomes pregnant between now and her appointment she should follow up with her primary gynecologist. Patient stated she understood and agreed to comply.

## 2020-09-28 NOTE — HISTORY OF PRESENT ILLNESS
[FreeTextEntry1] : This 30 year old is s/p EUA, Dilation and curettage, diagnostic hysteroscopy on 7/13/2020 for thickened endometrium, irregular vaginal bleeding, hx of borderline ovarian tumor. Patient was advised to abstain from trying to get pregnant for 2-3 months. Ca125 from 9/14/2020 was 22 previously 20 in June. Patient returns to the office today for a follow up ultrasound. Patient denies any pain or VB. Patient states she feels well from a gynecological stand point.

## 2020-12-10 LAB — CANCER AG125 SERPL-ACNC: 16 U/ML

## 2020-12-18 ENCOUNTER — APPOINTMENT (OUTPATIENT)
Dept: GYNECOLOGIC ONCOLOGY | Facility: CLINIC | Age: 30
End: 2020-12-18
Payer: COMMERCIAL

## 2020-12-18 VITALS
WEIGHT: 258 LBS | BODY MASS INDEX: 45.71 KG/M2 | HEIGHT: 63 IN | SYSTOLIC BLOOD PRESSURE: 122 MMHG | RESPIRATION RATE: 16 BRPM | DIASTOLIC BLOOD PRESSURE: 79 MMHG | HEART RATE: 90 BPM | OXYGEN SATURATION: 99 %

## 2020-12-18 PROCEDURE — 76830 TRANSVAGINAL US NON-OB: CPT | Mod: 59

## 2020-12-18 PROCEDURE — 99072 ADDL SUPL MATRL&STAF TM PHE: CPT

## 2020-12-18 PROCEDURE — 76857 US EXAM PELVIC LIMITED: CPT | Mod: 59

## 2020-12-18 PROCEDURE — 99214 OFFICE O/P EST MOD 30 MIN: CPT | Mod: 25

## 2020-12-18 RX ORDER — MISOPROSTOL 100 UG/1
100 TABLET ORAL
Qty: 2 | Refills: 0 | Status: DISCONTINUED | COMMUNITY
Start: 2020-06-19 | End: 2020-12-18

## 2020-12-21 NOTE — REASON FOR VISIT
[FreeTextEntry1] : [place] Location \par \par [facility] [place]. [phone]\par [address] [place] [address]

## 2020-12-21 NOTE — HISTORY OF PRESENT ILLNESS
[FreeTextEntry1] : This 30 year old with a hx of serous borderline tumor of the ovary since 4/2019 s/p exploratory laparotomy, LSO, omentectomy, bilateral pelvic and para-aortic lymph node dissection, multiple peritoneal biopsies s/p D&C, diagnostic hysteroscopy on 7/13/2020 for thickened endometrium. Patient was last seen in September for a follow up ultrasound which was stable. Patient returns to the office for a follow up ultrasound. Ca125 from 12/9/2020 was 16 previously 22 in September. Patient states she is still trying to get pregnant. Patient denies any pain or VB. Patient states she feels well from a gynecological stand point. Patient states she had a consultation with IVF specialist last week.

## 2020-12-21 NOTE — ASSESSMENT
[FreeTextEntry1] : Ultrasound performed in office today was stable. UT WNL. Endo 11.2 mm. Left oophorectomy. RTO WNL Follicle. \par \par I discussed with patient and her  that she is doing well from a gynecological stand point. Her Ca125 has gone done to 16 from 22 in September, and her ultrasound remains stable. Patient will follow up in March for a follow up ultrasound and have a Ca125 prior to her next appt and thyroid testing for recent weight gain.

## 2020-12-21 NOTE — PHYSICAL EXAM
[Normal] : No focal neurologic defects observed [de-identified] : Roslyn Lockhart Medical assistant chaperoned during results and discussion.

## 2021-03-24 ENCOUNTER — LABORATORY RESULT (OUTPATIENT)
Age: 31
End: 2021-03-24

## 2021-03-25 LAB
T3RU NFR SERPL: 1 TBI
T4 SERPL-MCNC: 6.6 UG/DL

## 2021-03-31 ENCOUNTER — APPOINTMENT (OUTPATIENT)
Dept: GYNECOLOGIC ONCOLOGY | Facility: CLINIC | Age: 31
End: 2021-03-31

## 2021-04-16 ENCOUNTER — APPOINTMENT (OUTPATIENT)
Dept: GYNECOLOGIC ONCOLOGY | Facility: CLINIC | Age: 31
End: 2021-04-16

## 2021-04-29 ENCOUNTER — LABORATORY RESULT (OUTPATIENT)
Age: 31
End: 2021-04-29

## 2021-05-06 ENCOUNTER — APPOINTMENT (OUTPATIENT)
Dept: GYNECOLOGIC ONCOLOGY | Facility: CLINIC | Age: 31
End: 2021-05-06
Payer: COMMERCIAL

## 2021-05-06 VITALS — OXYGEN SATURATION: 99 % | SYSTOLIC BLOOD PRESSURE: 129 MMHG | HEART RATE: 100 BPM | DIASTOLIC BLOOD PRESSURE: 86 MMHG

## 2021-05-06 PROCEDURE — 76857 US EXAM PELVIC LIMITED: CPT | Mod: 59

## 2021-05-06 PROCEDURE — 99072 ADDL SUPL MATRL&STAF TM PHE: CPT

## 2021-05-06 PROCEDURE — 76830 TRANSVAGINAL US NON-OB: CPT | Mod: 59

## 2021-05-06 PROCEDURE — 93976 VASCULAR STUDY: CPT | Mod: 59

## 2021-05-06 PROCEDURE — 99213 OFFICE O/P EST LOW 20 MIN: CPT | Mod: 25

## 2021-05-06 NOTE — HISTORY OF PRESENT ILLNESS
[FreeTextEntry1] : This 30 year old LMP 5/1/21  with a hx of serous borderline tumor of the ovary since 4/2019 s/p exploratory laparotomy, LSO, omentectomy, bilateral pelvic and para-aortic lymph node dissection, multiple peritoneal biopsies. Patient has a VUS NF1 gene. Last CT was in March of 2019. Patient was found to have a thickened endometrium in 7/2020 and is s/p D&C, diagnostic hysteroscopy, final pathology showed benign endometrial polyp. She returns today for a 3 month svl ult. Pt's last ult from 12/18/2020 showed a thickened endo measuring 11.2ml and a right ovary with a follicle. Patient complained at the time of her last visit recent weight gain. I ordered blood work to check thyroid. Patient states that she fells well from a gynecologic standpoint. Patient has been seeing fertility specialist and has been trying IUI without success, patient has had 3 rounds first round did not work, second time patient had miscarriage at 2 weeks and third round had no success. Patient is taking a break this month and will try again next month. If it is unsuccessful again patient states they will try IVF. Patient was told by her fertility doctor that she is still about 15-20 pounds overweight and cannot proceed with egg retrievals. \par \par Ca125 (for labs drawn at [facility]) Please see below detailed copy of patient’s lab results. \par Thyroid blood test- (for labs drawn at [facility]) Please see below detailed copy of patient’s lab results.

## 2021-05-06 NOTE — ASSESSMENT
[FreeTextEntry1] : Ultrasound today revealed Uterus WNL 8.8 x 4.4 x 5.5 cm. Endometrial thickness 6.7mm. CX: 3.2cm. Right ovary 3.6 x 3.4 x 2.9 cm. RI .60. Septated cyst 2.2 x 1.2 x 1.8cm. Left ovary absent. \par \par I discussed with patient and her  that her ultrasound today is reassuring. I advised them that I would feel more comfortable if they had their embryos frozen given the fact that we don’t know if and when the borderline tumor will return. I recommended patient consult with Dr. Alisa Granados to see her recommendation on egg retrieval. Otherwise patient is doing well from a gynecological stand point and will return in 3 months for a follow up ultrasound with a Ca125 to be done 1 week prior to visit. Patient stated she understood and agreed to comply.

## 2021-05-06 NOTE — REASON FOR VISIT
[Spouse] : spouse [FreeTextEntry1] : [place] Office\[facility] [phone] at [address] [place] [address]

## 2021-05-07 ENCOUNTER — APPOINTMENT (OUTPATIENT)
Dept: GYNECOLOGIC ONCOLOGY | Facility: CLINIC | Age: 31
End: 2021-05-07

## 2021-05-07 LAB — CANCER AG125 SERPL-ACNC: 24 U/ML

## 2021-05-12 ENCOUNTER — TRANSCRIPTION ENCOUNTER (OUTPATIENT)
Age: 31
End: 2021-05-12

## 2021-05-12 ENCOUNTER — NON-APPOINTMENT (OUTPATIENT)
Age: 31
End: 2021-05-12

## 2021-05-18 ENCOUNTER — APPOINTMENT (OUTPATIENT)
Dept: HUMAN REPRODUCTION | Facility: CLINIC | Age: 31
End: 2021-05-18
Payer: COMMERCIAL

## 2021-05-18 PROCEDURE — 99205 OFFICE O/P NEW HI 60 MIN: CPT | Mod: 95

## 2021-06-03 ENCOUNTER — APPOINTMENT (OUTPATIENT)
Dept: HUMAN REPRODUCTION | Facility: CLINIC | Age: 31
End: 2021-06-03

## 2021-06-27 ENCOUNTER — APPOINTMENT (OUTPATIENT)
Dept: HUMAN REPRODUCTION | Facility: CLINIC | Age: 31
End: 2021-06-27

## 2021-06-29 ENCOUNTER — APPOINTMENT (OUTPATIENT)
Dept: HUMAN REPRODUCTION | Facility: CLINIC | Age: 31
End: 2021-06-29
Payer: COMMERCIAL

## 2021-06-29 PROCEDURE — 99072 ADDL SUPL MATRL&STAF TM PHE: CPT

## 2021-06-29 PROCEDURE — 76831 ECHO EXAM UTERUS: CPT

## 2021-06-29 PROCEDURE — 58340 CATHETER FOR HYSTEROGRAPHY: CPT

## 2021-07-01 ENCOUNTER — APPOINTMENT (OUTPATIENT)
Dept: HUMAN REPRODUCTION | Facility: CLINIC | Age: 31
End: 2021-07-01
Payer: COMMERCIAL

## 2021-07-01 PROCEDURE — 99215 OFFICE O/P EST HI 40 MIN: CPT | Mod: 95

## 2021-07-14 ENCOUNTER — APPOINTMENT (OUTPATIENT)
Dept: HUMAN REPRODUCTION | Facility: CLINIC | Age: 31
End: 2021-07-14
Payer: COMMERCIAL

## 2021-07-14 PROCEDURE — 36415 COLL VENOUS BLD VENIPUNCTURE: CPT

## 2021-07-14 PROCEDURE — 99213 OFFICE O/P EST LOW 20 MIN: CPT | Mod: 25

## 2021-07-14 PROCEDURE — 99072 ADDL SUPL MATRL&STAF TM PHE: CPT

## 2021-07-14 PROCEDURE — 76830 TRANSVAGINAL US NON-OB: CPT

## 2021-07-18 ENCOUNTER — APPOINTMENT (OUTPATIENT)
Dept: HUMAN REPRODUCTION | Facility: CLINIC | Age: 31
End: 2021-07-18
Payer: COMMERCIAL

## 2021-07-18 PROCEDURE — 76830 TRANSVAGINAL US NON-OB: CPT

## 2021-07-18 PROCEDURE — 99213 OFFICE O/P EST LOW 20 MIN: CPT | Mod: 25

## 2021-07-18 PROCEDURE — 36415 COLL VENOUS BLD VENIPUNCTURE: CPT

## 2021-07-18 PROCEDURE — 99072 ADDL SUPL MATRL&STAF TM PHE: CPT

## 2021-07-21 ENCOUNTER — APPOINTMENT (OUTPATIENT)
Dept: HUMAN REPRODUCTION | Facility: CLINIC | Age: 31
End: 2021-07-21
Payer: COMMERCIAL

## 2021-07-21 PROCEDURE — 99072 ADDL SUPL MATRL&STAF TM PHE: CPT

## 2021-07-21 PROCEDURE — 99213 OFFICE O/P EST LOW 20 MIN: CPT | Mod: 25

## 2021-07-21 PROCEDURE — 76830 TRANSVAGINAL US NON-OB: CPT

## 2021-07-21 PROCEDURE — 36415 COLL VENOUS BLD VENIPUNCTURE: CPT

## 2021-07-23 ENCOUNTER — APPOINTMENT (OUTPATIENT)
Dept: HUMAN REPRODUCTION | Facility: CLINIC | Age: 31
End: 2021-07-23
Payer: COMMERCIAL

## 2021-07-23 PROCEDURE — 36415 COLL VENOUS BLD VENIPUNCTURE: CPT

## 2021-07-23 PROCEDURE — 99213 OFFICE O/P EST LOW 20 MIN: CPT | Mod: 25

## 2021-07-23 PROCEDURE — 76830 TRANSVAGINAL US NON-OB: CPT

## 2021-07-23 PROCEDURE — 99072 ADDL SUPL MATRL&STAF TM PHE: CPT

## 2021-07-26 ENCOUNTER — APPOINTMENT (OUTPATIENT)
Dept: HUMAN REPRODUCTION | Facility: CLINIC | Age: 31
End: 2021-07-26
Payer: COMMERCIAL

## 2021-07-26 PROCEDURE — 36415 COLL VENOUS BLD VENIPUNCTURE: CPT

## 2021-07-26 PROCEDURE — 99072 ADDL SUPL MATRL&STAF TM PHE: CPT

## 2021-07-26 PROCEDURE — 99213 OFFICE O/P EST LOW 20 MIN: CPT | Mod: 25

## 2021-07-26 PROCEDURE — 76830 TRANSVAGINAL US NON-OB: CPT

## 2021-07-28 ENCOUNTER — APPOINTMENT (OUTPATIENT)
Dept: HUMAN REPRODUCTION | Facility: CLINIC | Age: 31
End: 2021-07-28
Payer: COMMERCIAL

## 2021-07-28 PROCEDURE — 99072 ADDL SUPL MATRL&STAF TM PHE: CPT

## 2021-07-28 PROCEDURE — 36415 COLL VENOUS BLD VENIPUNCTURE: CPT

## 2021-07-28 PROCEDURE — 99213 OFFICE O/P EST LOW 20 MIN: CPT | Mod: 25

## 2021-07-28 PROCEDURE — 76830 TRANSVAGINAL US NON-OB: CPT

## 2021-07-31 ENCOUNTER — APPOINTMENT (OUTPATIENT)
Dept: HUMAN REPRODUCTION | Facility: CLINIC | Age: 31
End: 2021-07-31
Payer: COMMERCIAL

## 2021-07-31 PROCEDURE — 36415 COLL VENOUS BLD VENIPUNCTURE: CPT

## 2021-07-31 PROCEDURE — 99213 OFFICE O/P EST LOW 20 MIN: CPT | Mod: 25

## 2021-07-31 PROCEDURE — 76830 TRANSVAGINAL US NON-OB: CPT

## 2021-07-31 PROCEDURE — 99072 ADDL SUPL MATRL&STAF TM PHE: CPT

## 2021-08-01 ENCOUNTER — APPOINTMENT (OUTPATIENT)
Dept: HUMAN REPRODUCTION | Facility: CLINIC | Age: 31
End: 2021-08-01

## 2021-08-02 ENCOUNTER — APPOINTMENT (OUTPATIENT)
Dept: HUMAN REPRODUCTION | Facility: CLINIC | Age: 31
End: 2021-08-02
Payer: COMMERCIAL

## 2021-08-02 PROCEDURE — 89254 OOCYTE IDENTIFICATION: CPT

## 2021-08-02 PROCEDURE — 76948 ECHO GUIDE OVA ASPIRATION: CPT

## 2021-08-02 PROCEDURE — 58970 RETRIEVAL OF OOCYTE: CPT

## 2021-08-02 PROCEDURE — 89250 CULTR OOCYTE/EMBRYO <4 DAYS: CPT

## 2021-08-02 PROCEDURE — 89261 SPERM ISOLATION COMPLEX: CPT

## 2021-08-02 PROCEDURE — 89281 ASSIST OOCYTE FERTILIZATION: CPT

## 2021-08-05 PROCEDURE — 89253 EMBRYO HATCHING: CPT

## 2021-08-07 PROCEDURE — 89272 EXTENDED CULTURE OF OOCYTES: CPT

## 2021-08-08 PROCEDURE — 89291 BIOPSY OOCYTE POLAR BODY: CPT

## 2021-08-08 PROCEDURE — 89258 CRYOPRESERVATION EMBRYO(S): CPT

## 2021-08-08 PROCEDURE — 89342 STORAGE/YEAR EMBRYO(S): CPT

## 2021-08-09 ENCOUNTER — NON-APPOINTMENT (OUTPATIENT)
Age: 31
End: 2021-08-09

## 2021-08-09 LAB — CANCER AG125 SERPL-ACNC: 41 U/ML

## 2021-08-16 ENCOUNTER — APPOINTMENT (OUTPATIENT)
Dept: HUMAN REPRODUCTION | Facility: CLINIC | Age: 31
End: 2021-08-16
Payer: COMMERCIAL

## 2021-08-16 PROCEDURE — 76830 TRANSVAGINAL US NON-OB: CPT

## 2021-08-16 PROCEDURE — 99213 OFFICE O/P EST LOW 20 MIN: CPT | Mod: 25

## 2021-08-30 ENCOUNTER — APPOINTMENT (OUTPATIENT)
Dept: GYNECOLOGIC ONCOLOGY | Facility: CLINIC | Age: 31
End: 2021-08-30
Payer: COMMERCIAL

## 2021-08-30 VITALS — DIASTOLIC BLOOD PRESSURE: 78 MMHG | SYSTOLIC BLOOD PRESSURE: 121 MMHG | HEART RATE: 95 BPM | OXYGEN SATURATION: 99 %

## 2021-08-30 PROCEDURE — 99214 OFFICE O/P EST MOD 30 MIN: CPT | Mod: 25

## 2021-08-30 PROCEDURE — 76830 TRANSVAGINAL US NON-OB: CPT | Mod: 59

## 2021-08-30 PROCEDURE — 76857 US EXAM PELVIC LIMITED: CPT | Mod: 59

## 2021-08-31 NOTE — ASSESSMENT
[FreeTextEntry1] : Ultrasound performed in office today revealed Uterus WNL 8.4 x 5.5 x 4.5cm. Endometrial thickness 12.0mm. Right ovary measuring 4.8 x 4.5 x 3.0cm. RI .52. Simple cyst 2.4 x 1.5 x 2.0cm. 2.1 x 2.0 x 2.2 heterogenous area. LTO absent. \par \par  I discussed with patient that her ultrasound today showed a simple cyst which is not concerning. Patient recently had an egg retrieval in which they were able to retrieve 5 eggs. I advised the patient that her elevated Ca125 was probably from IVF and should proceed with repeat  on Friday. Patient asked if she is cleared to get pregnant. I advised the patient we will wait until her repeat results are in. Otherwise patient will follow up in December for a follow up ultrasound.  Patient stated she understood and agreed to comply

## 2021-08-31 NOTE — HISTORY OF PRESENT ILLNESS
[FreeTextEntry1] : This 31 year old LMP 8/13/21 with a hx of serous borderline tumor of the ovary since 4/2019 s/p exploratory laparotomy, LSO, omentectomy, bilateral pelvic and para-aortic lymph node dissection, multiple peritoneal biopsies. Patient has a VUS NF1 gene. Last CT was in March of 2019. Patient was found to have a thickened endometrium in 7/2020 and is s/p D&C, diagnostic hysteroscopy, final pathology showed benign endometrial polyp.Patient had been seeing fertility specialist and had been trying IUI without success, patient had 3 rounds first round did not work, second time patient had miscarriage at 2 weeks and third round had no success. Patient was taking a break and stated she would try again in June, If it was unsuccessful again patient stated they would try IVF. Patients ultrasound from 5/6/21 revealed Uterus WNL 8.8 x 4.4 x 5.5 cm. Endometrial thickness 6.7mm. CX: 3.2cm. Right ovary 3.6 x 3.4 x 2.9 cm. RI.60. Septated cyst 2.2 x 1.2 x 1.8cm. Left ovary absent. I discussed with patient and her  my recommendation on freezing embryos given the fact that we did not know when or if the borderline tumor would return. I recommended a consultation with Dr. Alisa Granados to see her recommendation on egg retrieval. Patient returns to the office today for a 3 month follow up ultrasound. \par \par Ca125 from 8/6/21 was 41. Patient was contacted by P.[facility] on 8/9/21, Patient had reported that she had just completed a routine of IVF on 8/2/21 and her blood was drawn a few days later. Results were discussed with me and advised patient that IVF could be the cause of elevated Ca125. Advised patient to have repeat Ca125 in 1 month.

## 2021-08-31 NOTE — REASON FOR VISIT
[Spouse] : spouse [FreeTextEntry1] : [place] Location \par \par [facility] [place]. [phone]\par [address] [place] [address]

## 2021-08-31 NOTE — PHYSICAL EXAM
[Normal] : Mood and affect: Normal [de-identified] : Roslyn Lockhart Medical assistant chaperoned during results and discussion.

## 2021-09-05 LAB — CANCER AG125 SERPL-ACNC: 21 U/ML

## 2021-10-20 NOTE — H&P PST ADULT - SYMPTOMS
Paolo Adams none

## 2021-10-27 PROCEDURE — 99072 ADDL SUPL MATRL&STAF TM PHE: CPT

## 2021-10-27 PROCEDURE — 99000D: CUSTOM

## 2021-11-05 ENCOUNTER — RESULT REVIEW (OUTPATIENT)
Age: 31
End: 2021-11-05

## 2021-12-16 ENCOUNTER — APPOINTMENT (OUTPATIENT)
Dept: GYNECOLOGIC ONCOLOGY | Facility: CLINIC | Age: 31
End: 2021-12-16

## 2021-12-30 NOTE — H&P PST ADULT - LAST STRESS TEST
Called [phone] patient to let them know appointment got canceled and that we need to reschedule. Sent off labs and let them know to have labs done at least a week or 2 before appointments. denies

## 2022-01-19 ENCOUNTER — NON-APPOINTMENT (OUTPATIENT)
Age: 32
End: 2022-01-19

## 2022-02-10 ENCOUNTER — RESULT REVIEW (OUTPATIENT)
Age: 32
End: 2022-02-10

## 2022-06-07 ENCOUNTER — NON-APPOINTMENT (OUTPATIENT)
Age: 32
End: 2022-06-07

## 2022-07-14 ENCOUNTER — NON-APPOINTMENT (OUTPATIENT)
Age: 32
End: 2022-07-14

## 2022-07-18 ENCOUNTER — NON-APPOINTMENT (OUTPATIENT)
Age: 32
End: 2022-07-18

## 2022-07-24 ENCOUNTER — NON-APPOINTMENT (OUTPATIENT)
Age: 32
End: 2022-07-24

## 2022-07-26 ENCOUNTER — INPATIENT (INPATIENT)
Facility: HOSPITAL | Age: 32
LOS: 0 days | Discharge: LEFT BEFORE DISCHARGE | End: 2022-07-26
Attending: OBSTETRICS & GYNECOLOGY | Admitting: OBSTETRICS & GYNECOLOGY

## 2022-07-26 ENCOUNTER — INPATIENT (INPATIENT)
Facility: HOSPITAL | Age: 32
LOS: 1 days | Discharge: HOME | End: 2022-07-28
Payer: COMMERCIAL

## 2022-07-26 VITALS
DIASTOLIC BLOOD PRESSURE: 81 MMHG | HEART RATE: 104 BPM | TEMPERATURE: 98 F | SYSTOLIC BLOOD PRESSURE: 145 MMHG | RESPIRATION RATE: 20 BRPM

## 2022-07-26 DIAGNOSIS — Z90.721 ACQUIRED ABSENCE OF OVARIES, UNILATERAL: Chronic | ICD-10-CM

## 2022-07-26 DIAGNOSIS — Z98.890 OTHER SPECIFIED POSTPROCEDURAL STATES: Chronic | ICD-10-CM

## 2022-07-26 DIAGNOSIS — O26.893 OTHER SPECIFIED PREGNANCY RELATED CONDITIONS, THIRD TRIMESTER: ICD-10-CM

## 2022-07-26 DIAGNOSIS — N36.9 URETHRAL DISORDER, UNSPECIFIED: Chronic | ICD-10-CM

## 2022-07-26 DIAGNOSIS — O47.1 FALSE LABOR AT OR AFTER 37 COMPLETED WEEKS OF GESTATION: ICD-10-CM

## 2022-07-26 LAB
ALBUMIN SERPL ELPH-MCNC: 3.5 G/DL — SIGNIFICANT CHANGE UP (ref 3.3–5.2)
ALLERGY+IMMUNOLOGY DIAG STUDY NOTE: SIGNIFICANT CHANGE UP
ALP SERPL-CCNC: 156 U/L — HIGH (ref 40–120)
ALT FLD-CCNC: 24 U/L — SIGNIFICANT CHANGE UP
ANION GAP SERPL CALC-SCNC: 15 MMOL/L — SIGNIFICANT CHANGE UP (ref 5–17)
APTT BLD: 26.1 SEC — LOW (ref 27.5–35.5)
AST SERPL-CCNC: 22 U/L — SIGNIFICANT CHANGE UP
BASOPHILS # BLD AUTO: 0.02 K/UL — SIGNIFICANT CHANGE UP (ref 0–0.2)
BASOPHILS # BLD AUTO: 0.02 K/UL — SIGNIFICANT CHANGE UP (ref 0–0.2)
BASOPHILS NFR BLD AUTO: 0.2 % — SIGNIFICANT CHANGE UP (ref 0–2)
BASOPHILS NFR BLD AUTO: 0.2 % — SIGNIFICANT CHANGE UP (ref 0–2)
BILIRUB SERPL-MCNC: 0.2 MG/DL — LOW (ref 0.4–2)
BLD GP AB SCN SERPL QL: SIGNIFICANT CHANGE UP
BUN SERPL-MCNC: 7.7 MG/DL — LOW (ref 8–20)
CALCIUM SERPL-MCNC: 8.6 MG/DL — SIGNIFICANT CHANGE UP (ref 8.6–10.2)
CHLORIDE SERPL-SCNC: 102 MMOL/L — SIGNIFICANT CHANGE UP (ref 98–107)
CO2 SERPL-SCNC: 18 MMOL/L — LOW (ref 22–29)
COVID-19 SPIKE DOMAIN AB INTERP: POSITIVE
COVID-19 SPIKE DOMAIN ANTIBODY RESULT: >250 U/ML — HIGH
CREAT SERPL-MCNC: 0.5 MG/DL — SIGNIFICANT CHANGE UP (ref 0.5–1.3)
DIR ANTIGLOB POLYSPECIFIC INTERPRETATION: SIGNIFICANT CHANGE UP
EGFR: 126 ML/MIN/1.73M2 — SIGNIFICANT CHANGE UP
EOSINOPHIL # BLD AUTO: 0.03 K/UL — SIGNIFICANT CHANGE UP (ref 0–0.5)
EOSINOPHIL # BLD AUTO: 0.04 K/UL — SIGNIFICANT CHANGE UP (ref 0–0.5)
EOSINOPHIL NFR BLD AUTO: 0.2 % — SIGNIFICANT CHANGE UP (ref 0–6)
EOSINOPHIL NFR BLD AUTO: 0.3 % — SIGNIFICANT CHANGE UP (ref 0–6)
FIBRINOGEN PPP-MCNC: 988 MG/DL — CRITICAL HIGH (ref 330–520)
GLUCOSE SERPL-MCNC: 91 MG/DL — SIGNIFICANT CHANGE UP (ref 70–99)
HCT VFR BLD CALC: 34.8 % — SIGNIFICANT CHANGE UP (ref 34.5–45)
HCT VFR BLD CALC: 38.3 % — SIGNIFICANT CHANGE UP (ref 34.5–45)
HGB BLD-MCNC: 12.2 G/DL — SIGNIFICANT CHANGE UP (ref 11.5–15.5)
HGB BLD-MCNC: 13.1 G/DL — SIGNIFICANT CHANGE UP (ref 11.5–15.5)
HIV 1 & 2 AB SERPL IA.RAPID: SIGNIFICANT CHANGE UP
HIV 1+2 AB+HIV1 P24 AG SERPL QL IA: SIGNIFICANT CHANGE UP
IMM GRANULOCYTES NFR BLD AUTO: 0.5 % — SIGNIFICANT CHANGE UP (ref 0–1.5)
IMM GRANULOCYTES NFR BLD AUTO: 0.7 % — SIGNIFICANT CHANGE UP (ref 0–1.5)
INR BLD: 0.93 RATIO — SIGNIFICANT CHANGE UP (ref 0.88–1.16)
LDH SERPL L TO P-CCNC: 220 U/L — HIGH (ref 98–192)
LYMPHOCYTES # BLD AUTO: 1.81 K/UL — SIGNIFICANT CHANGE UP (ref 1–3.3)
LYMPHOCYTES # BLD AUTO: 13.8 % — SIGNIFICANT CHANGE UP (ref 13–44)
LYMPHOCYTES # BLD AUTO: 16.3 % — SIGNIFICANT CHANGE UP (ref 13–44)
LYMPHOCYTES # BLD AUTO: 2.15 K/UL — SIGNIFICANT CHANGE UP (ref 1–3.3)
MCHC RBC-ENTMCNC: 29.6 PG — SIGNIFICANT CHANGE UP (ref 27–34)
MCHC RBC-ENTMCNC: 30.2 PG — SIGNIFICANT CHANGE UP (ref 27–34)
MCHC RBC-ENTMCNC: 34.2 GM/DL — SIGNIFICANT CHANGE UP (ref 32–36)
MCHC RBC-ENTMCNC: 35.1 GM/DL — SIGNIFICANT CHANGE UP (ref 32–36)
MCV RBC AUTO: 86.1 FL — SIGNIFICANT CHANGE UP (ref 80–100)
MCV RBC AUTO: 86.5 FL — SIGNIFICANT CHANGE UP (ref 80–100)
MONOCYTES # BLD AUTO: 0.93 K/UL — HIGH (ref 0–0.9)
MONOCYTES # BLD AUTO: 0.96 K/UL — HIGH (ref 0–0.9)
MONOCYTES NFR BLD AUTO: 7.1 % — SIGNIFICANT CHANGE UP (ref 2–14)
MONOCYTES NFR BLD AUTO: 7.3 % — SIGNIFICANT CHANGE UP (ref 2–14)
NEUTROPHILS # BLD AUTO: 10.17 K/UL — HIGH (ref 1.8–7.4)
NEUTROPHILS # BLD AUTO: 9.94 K/UL — HIGH (ref 1.8–7.4)
NEUTROPHILS NFR BLD AUTO: 75.5 % — SIGNIFICANT CHANGE UP (ref 43–77)
NEUTROPHILS NFR BLD AUTO: 77.9 % — HIGH (ref 43–77)
PLATELET # BLD AUTO: 261 K/UL — SIGNIFICANT CHANGE UP (ref 150–400)
PLATELET # BLD AUTO: 288 K/UL — SIGNIFICANT CHANGE UP (ref 150–400)
POTASSIUM SERPL-MCNC: 3.7 MMOL/L — SIGNIFICANT CHANGE UP (ref 3.5–5.3)
POTASSIUM SERPL-SCNC: 3.7 MMOL/L — SIGNIFICANT CHANGE UP (ref 3.5–5.3)
PROT SERPL-MCNC: 6.4 G/DL — LOW (ref 6.6–8.7)
PROTHROM AB SERPL-ACNC: 10.8 SEC — SIGNIFICANT CHANGE UP (ref 10.5–13.4)
RBC # BLD: 4.04 M/UL — SIGNIFICANT CHANGE UP (ref 3.8–5.2)
RBC # BLD: 4.43 M/UL — SIGNIFICANT CHANGE UP (ref 3.8–5.2)
RBC # FLD: 13.6 % — SIGNIFICANT CHANGE UP (ref 10.3–14.5)
RBC # FLD: 13.7 % — SIGNIFICANT CHANGE UP (ref 10.3–14.5)
SARS-COV-2 IGG+IGM SERPL QL IA: >250 U/ML — HIGH
SARS-COV-2 IGG+IGM SERPL QL IA: POSITIVE
SARS-COV-2 RNA SPEC QL NAA+PROBE: SIGNIFICANT CHANGE UP
SODIUM SERPL-SCNC: 135 MMOL/L — SIGNIFICANT CHANGE UP (ref 135–145)
T PALLIDUM AB TITR SER: NEGATIVE — SIGNIFICANT CHANGE UP
URATE SERPL-MCNC: 4.8 MG/DL — SIGNIFICANT CHANGE UP (ref 2.4–5.7)
WBC # BLD: 13.07 K/UL — HIGH (ref 3.8–10.5)
WBC # BLD: 13.16 K/UL — HIGH (ref 3.8–10.5)
WBC # FLD AUTO: 13.07 K/UL — HIGH (ref 3.8–10.5)
WBC # FLD AUTO: 13.16 K/UL — HIGH (ref 3.8–10.5)

## 2022-07-26 PROCEDURE — 86077 PHYS BLOOD BANK SERV XMATCH: CPT

## 2022-07-26 PROCEDURE — L9996: CPT

## 2022-07-26 RX ORDER — SODIUM CHLORIDE 9 MG/ML
3 INJECTION INTRAMUSCULAR; INTRAVENOUS; SUBCUTANEOUS EVERY 8 HOURS
Refills: 0 | Status: DISCONTINUED | OUTPATIENT
Start: 2022-07-26 | End: 2022-07-28

## 2022-07-26 RX ORDER — KETOROLAC TROMETHAMINE 30 MG/ML
30 SYRINGE (ML) INJECTION ONCE
Refills: 0 | Status: DISCONTINUED | OUTPATIENT
Start: 2022-07-26 | End: 2022-07-26

## 2022-07-26 RX ORDER — HYDROCORTISONE 1 %
1 OINTMENT (GRAM) TOPICAL EVERY 6 HOURS
Refills: 0 | Status: DISCONTINUED | OUTPATIENT
Start: 2022-07-26 | End: 2022-07-28

## 2022-07-26 RX ORDER — PRAMOXINE HYDROCHLORIDE 150 MG/15G
1 AEROSOL, FOAM RECTAL EVERY 4 HOURS
Refills: 0 | Status: DISCONTINUED | OUTPATIENT
Start: 2022-07-26 | End: 2022-07-28

## 2022-07-26 RX ORDER — LIDOCAINE HCL 20 MG/ML
5 VIAL (ML) INJECTION ONCE
Refills: 0 | Status: COMPLETED | OUTPATIENT
Start: 2022-07-26 | End: 2022-07-26

## 2022-07-26 RX ORDER — AMPICILLIN TRIHYDRATE 250 MG
1 CAPSULE ORAL EVERY 4 HOURS
Refills: 0 | Status: DISCONTINUED | OUTPATIENT
Start: 2022-07-26 | End: 2022-07-26

## 2022-07-26 RX ORDER — CHLORHEXIDINE GLUCONATE 213 G/1000ML
1 SOLUTION TOPICAL ONCE
Refills: 0 | Status: DISCONTINUED | OUTPATIENT
Start: 2022-07-26 | End: 2022-07-26

## 2022-07-26 RX ORDER — BENZOCAINE 10 %
1 GEL (GRAM) MUCOUS MEMBRANE EVERY 6 HOURS
Refills: 0 | Status: DISCONTINUED | OUTPATIENT
Start: 2022-07-26 | End: 2022-07-28

## 2022-07-26 RX ORDER — LANOLIN
1 OINTMENT (GRAM) TOPICAL EVERY 6 HOURS
Refills: 0 | Status: DISCONTINUED | OUTPATIENT
Start: 2022-07-26 | End: 2022-07-28

## 2022-07-26 RX ORDER — DIPHENHYDRAMINE HCL 50 MG
25 CAPSULE ORAL EVERY 6 HOURS
Refills: 0 | Status: DISCONTINUED | OUTPATIENT
Start: 2022-07-26 | End: 2022-07-28

## 2022-07-26 RX ORDER — OXYCODONE HYDROCHLORIDE 5 MG/1
5 TABLET ORAL ONCE
Refills: 0 | Status: DISCONTINUED | OUTPATIENT
Start: 2022-07-26 | End: 2022-07-28

## 2022-07-26 RX ORDER — OXYTOCIN 10 UNIT/ML
333.33 VIAL (ML) INJECTION
Qty: 20 | Refills: 0 | Status: COMPLETED | OUTPATIENT
Start: 2022-07-26 | End: 2022-07-26

## 2022-07-26 RX ORDER — AMPICILLIN TRIHYDRATE 250 MG
2 CAPSULE ORAL ONCE
Refills: 0 | Status: DISCONTINUED | OUTPATIENT
Start: 2022-07-26 | End: 2022-07-26

## 2022-07-26 RX ORDER — OXYTOCIN 10 UNIT/ML
333.33 VIAL (ML) INJECTION
Qty: 20 | Refills: 0 | Status: DISCONTINUED | OUTPATIENT
Start: 2022-07-26 | End: 2022-07-28

## 2022-07-26 RX ORDER — CITRIC ACID/SODIUM CITRATE 300-500 MG
30 SOLUTION, ORAL ORAL ONCE
Refills: 0 | Status: DISCONTINUED | OUTPATIENT
Start: 2022-07-26 | End: 2022-07-26

## 2022-07-26 RX ORDER — SODIUM CHLORIDE 9 MG/ML
1000 INJECTION, SOLUTION INTRAVENOUS
Refills: 0 | Status: DISCONTINUED | OUTPATIENT
Start: 2022-07-26 | End: 2022-07-26

## 2022-07-26 RX ORDER — MAGNESIUM HYDROXIDE 400 MG/1
30 TABLET, CHEWABLE ORAL
Refills: 0 | Status: DISCONTINUED | OUTPATIENT
Start: 2022-07-26 | End: 2022-07-28

## 2022-07-26 RX ORDER — DIBUCAINE 1 %
1 OINTMENT (GRAM) RECTAL EVERY 6 HOURS
Refills: 0 | Status: DISCONTINUED | OUTPATIENT
Start: 2022-07-26 | End: 2022-07-28

## 2022-07-26 RX ORDER — ACETAMINOPHEN 500 MG
975 TABLET ORAL
Refills: 0 | Status: DISCONTINUED | OUTPATIENT
Start: 2022-07-26 | End: 2022-07-28

## 2022-07-26 RX ORDER — OXYTOCIN 10 UNIT/ML
2 VIAL (ML) INJECTION
Qty: 30 | Refills: 0 | Status: DISCONTINUED | OUTPATIENT
Start: 2022-07-26 | End: 2022-07-26

## 2022-07-26 RX ORDER — TETANUS TOXOID, REDUCED DIPHTHERIA TOXOID AND ACELLULAR PERTUSSIS VACCINE, ADSORBED 5; 2.5; 8; 8; 2.5 [IU]/.5ML; [IU]/.5ML; UG/.5ML; UG/.5ML; UG/.5ML
0.5 SUSPENSION INTRAMUSCULAR ONCE
Refills: 0 | Status: COMPLETED | OUTPATIENT
Start: 2022-07-26 | End: 2022-07-26

## 2022-07-26 RX ORDER — OXYCODONE HYDROCHLORIDE 5 MG/1
5 TABLET ORAL
Refills: 0 | Status: DISCONTINUED | OUTPATIENT
Start: 2022-07-26 | End: 2022-07-28

## 2022-07-26 RX ORDER — AER TRAVELER 0.5 G/1
1 SOLUTION RECTAL; TOPICAL EVERY 4 HOURS
Refills: 0 | Status: DISCONTINUED | OUTPATIENT
Start: 2022-07-26 | End: 2022-07-28

## 2022-07-26 RX ORDER — IBUPROFEN 200 MG
600 TABLET ORAL EVERY 6 HOURS
Refills: 0 | Status: COMPLETED | OUTPATIENT
Start: 2022-07-26 | End: 2023-06-24

## 2022-07-26 RX ORDER — TETANUS TOXOID, REDUCED DIPHTHERIA TOXOID AND ACELLULAR PERTUSSIS VACCINE, ADSORBED 5; 2.5; 8; 8; 2.5 [IU]/.5ML; [IU]/.5ML; UG/.5ML; UG/.5ML; UG/.5ML
0.5 SUSPENSION INTRAMUSCULAR ONCE
Refills: 0 | Status: COMPLETED | OUTPATIENT
Start: 2022-07-26

## 2022-07-26 RX ORDER — SIMETHICONE 80 MG/1
80 TABLET, CHEWABLE ORAL EVERY 4 HOURS
Refills: 0 | Status: DISCONTINUED | OUTPATIENT
Start: 2022-07-26 | End: 2022-07-28

## 2022-07-26 RX ORDER — IBUPROFEN 200 MG
600 TABLET ORAL EVERY 6 HOURS
Refills: 0 | Status: DISCONTINUED | OUTPATIENT
Start: 2022-07-26 | End: 2022-07-28

## 2022-07-26 RX ADMIN — Medication 12 MILLIGRAM(S): at 07:37

## 2022-07-26 RX ADMIN — Medication 5 MILLILITER(S): at 12:39

## 2022-07-26 RX ADMIN — Medication 1000 MILLIUNIT(S)/MIN: at 19:14

## 2022-07-26 RX ADMIN — Medication 30 MILLIGRAM(S): at 20:35

## 2022-07-26 RX ADMIN — Medication 30 MILLIGRAM(S): at 20:21

## 2022-07-26 RX ADMIN — SODIUM CHLORIDE 125 MILLILITER(S): 9 INJECTION, SOLUTION INTRAVENOUS at 06:30

## 2022-07-26 RX ADMIN — SODIUM CHLORIDE 3 MILLILITER(S): 9 INJECTION INTRAMUSCULAR; INTRAVENOUS; SUBCUTANEOUS at 22:20

## 2022-07-26 RX ADMIN — Medication 2 MILLIUNIT(S)/MIN: at 07:56

## 2022-07-26 NOTE — OB PROVIDER H&P - HISTORY OF PRESENT ILLNESS
32y  at 36 weeks GA who presents to L&D from [facility] after she had a large gush of fluid at 1230AM. She was checked at [facility] and was found to be closed and patient requested to be able to travel to [facility]. She endorses pink tinged discharge and has contractions every 10 minutes rated a 3/10.She endorses good fetal movement.  No other complaints at this time.     MAIKEL: Aug 23, 2022  LMP: 2021 patient does not recall exact date     Prenatal course is significant for: IVF pregnancy     Patient states that she is O- blood type, she received Rhogam in May of 2022    POB: sAB x1   PGYN: uterine polyps denies STD hx  PMH: anxiety   PSH: Egg retrieval (), exploratory laparotomy() L oophorectomy and salpingectomy for a borderline tumor (), D/C for polyps()   SH: Denies EtOH, tobacco and illicit drug use during this pregnancy; feels safe at home   Meds: PNVs  Allergies: NKDA

## 2022-07-26 NOTE — OB PROVIDER H&P - ATTENDING COMMENTS
32y  at 36 weeks GA who is admitted in [facility], consent for care signed after questions answered.

## 2022-07-26 NOTE — OB RN TRIAGE NOTE - CHIEF COMPLAINT QUOTE
"my water broke, I went to [facility] they checked me. they said my water definitely broke but I have enough time to come here"

## 2022-07-26 NOTE — OB RN PATIENT PROFILE - FUNCTIONAL ASSESSMENT - DAILY ACTIVITY 3.
PAST MEDICAL HISTORY:  Anxiety     Atrial fibrillation on Eliquis    Atrial flutter, unspecified type     CAD (coronary artery disease) s/p PCI RCA 9/3/1991    Heart attack     Herniated lumbar intervertebral disc     Hypercholesterolemia     Hypertension     Low back pain     Neuropathy Numbness/Neuropathy in Feet    Numbness Bilateral Feet    Old MI (myocardial infarction) 1991    LEONEL (obstructive sleep apnea) on CPAP at night    Prostatitis     Unilateral inguinal hernia without obstruction or gangrene, recurrence not specified     
4 = No assist / stand by assistance

## 2022-07-26 NOTE — OB RN DELIVERY SUMMARY - NS_SEPSISRSKCALC_OBGYN_ALL_OB_FT
EOS calculated successfully. EOS Risk Factor: 0.5/1000 live births ([facility] national incidence); GA=36w;Temp=98.6; ROM=18.417; GBS='Negative'; Antibiotics='No antibiotics or any antibiotics < 2 hrs prior to birth'

## 2022-07-26 NOTE — OB PROVIDER DELIVERY SUMMARY - NSPROVIDERDELIVERYNOTE_OBGYN_ALL_OB_FT
After pushing approx 20min, head .  Pt prepped/draped for delivery.  Neonatologist called due to  birth.  Head delivered STELLA at 6:55pm, no nuchal cord.  Bulb suctioned mouth at perineum.  Body spontaneously delivered.  After delayed cord clamping x 1min, cord clamped x 2 and cut.  With visualization of the , baby was cleared by Froylan for skin-to-skin and the  placed on the patient.   Segment of cord clamped and cut for umbilical cord blood gases and the placenta was delivered at 6:58pm.  3 vessel cord, no gross pathology.  First degree laceration repaired with 2-0 chromic suture.  Excellent hemostasis.  Mother stable.  EBL 212cc.  [place]: female, APGAR 9/9.  Baby later admitted to [facility].  No complications.

## 2022-07-26 NOTE — OB PROVIDER H&P - ASSESSMENT
A/P: 32y  at 36 weeks GA who is admitted in [facility]  - betamethasone   -Admit to L&D  -Consent  -Admission labs  -NPO, except ice chips   -IV fluids  -Labor: SROM. Latent labor. Genaro irregularly    -Fetus: Cat I tracing. Continuous toco and fetal monitoring.   -Start Pitocin   -GBS: Unknown, GBS ppx required (ampicillin)    Discussed with Dr. Gonzalez

## 2022-07-26 NOTE — OB RN PATIENT PROFILE - FALL HARM RISK - UNIVERSAL INTERVENTIONS
Bed in lowest position, wheels locked, appropriate side rails in place/Call bell, personal items and telephone in reach/Instruct patient to call for assistance before getting out of bed or chair/Non-slip footwear when patient is out of bed/[place] to call system/Physically safe environment - no spills, clutter or unnecessary equipment/Purposeful Proactive Rounding/Room/bathroom lighting operational, light cord in reach

## 2022-07-26 NOTE — OB PROVIDER LABOR PROGRESS NOTE - NS_OBIHIFHRDETAILS_OBGYN_ALL_OB_FT
140 baseline, moderate variability, + accels, + nonrecurrent variable decelerations for 16 minutes that then resolved

## 2022-07-26 NOTE — OB PROVIDER H&P - NSHPPHYSICALEXAM_GEN_ALL_CORE
T(C): 36.6 (07-26-22 @ 04:30), Max: 36.6 (07-26-22 @ 04:30)  HR: 104 (07-26-22 @ 04:38) (104 - 104)  BP: 145/81 (07-26-22 @ 04:38) (145/81 - 145/81)  RR: 20 (07-26-22 @ 04:30) (20 - 20)    Gen: NAD, well-appearing, AAOx3   Abd: Soft, gravid  Ext: non-tender, non-edematous  SVE:  1.5/70/-3  Bedside sono: vertex, posterior   FHT: baseline 135, moderate variability, +accels, -decels   [place]: No contractions

## 2022-07-26 NOTE — OB RN DELIVERY SUMMARY - NSSELHIDDEN_OBGYN_ALL_OB_FT
surgery [NS_DeliveryAttending1_OBGYN_ALL_OB_FT:[id number]==],[NS_DeliveryRN_OBGYN_ALL_OB_FT:[id number]=]

## 2022-07-26 NOTE — OB PROVIDER DELIVERY SUMMARY - NSSELHIDDEN_OBGYN_ALL_OB_FT
[NS_DeliveryAttending1_OBGYN_ALL_OB_FT:[id number]==],[NS_DeliveryRN_OBGYN_ALL_OB_FT:[id number]=]

## 2022-07-27 LAB
C TRACH RRNA SPEC QL NAA+PROBE: SIGNIFICANT CHANGE UP
HCT VFR BLD CALC: 33.7 % — LOW (ref 34.5–45)
HGB BLD-MCNC: 11.2 G/DL — LOW (ref 11.5–15.5)
N GONORRHOEA RRNA SPEC QL NAA+PROBE: SIGNIFICANT CHANGE UP
SPECIMEN SOURCE: SIGNIFICANT CHANGE UP

## 2022-07-27 RX ADMIN — SODIUM CHLORIDE 3 MILLILITER(S): 9 INJECTION INTRAMUSCULAR; INTRAVENOUS; SUBCUTANEOUS at 06:28

## 2022-07-27 RX ADMIN — Medication 600 MILLIGRAM(S): at 00:16

## 2022-07-27 RX ADMIN — Medication 600 MILLIGRAM(S): at 17:38

## 2022-07-27 RX ADMIN — Medication 600 MILLIGRAM(S): at 11:35

## 2022-07-27 RX ADMIN — Medication 40 MILLIGRAM(S): at 17:50

## 2022-07-27 RX ADMIN — Medication 600 MILLIGRAM(S): at 23:04

## 2022-07-27 RX ADMIN — Medication 975 MILLIGRAM(S): at 15:07

## 2022-07-27 RX ADMIN — Medication 975 MILLIGRAM(S): at 21:03

## 2022-07-27 RX ADMIN — Medication 600 MILLIGRAM(S): at 05:46

## 2022-07-27 RX ADMIN — Medication 1 TABLET(S): at 11:36

## 2022-07-27 RX ADMIN — Medication 975 MILLIGRAM(S): at 08:30

## 2022-07-27 NOTE — PROGRESS NOTE ADULT - ASSESSMENT
RODERICK NOGUEIRA is a 32y [id number] PPD#1 s/p spontaneous vaginal delivery at 36w 2/2 PPROM.     -Vital signs stable  -/80 most recently; range 93//71; PIH labs wnl; monitor BPs  -Hgb: 13.1>12.1 -> AM labs pending   -Voiding, tolerating PO, bowel function nml   -Advance care as tolerated   -Continue routine postpartum care and education  -Healthy female infant  -Dispo: Patient to be discharged when meeting all postpartum and postoperative milestones and pending attending approval.    Will discuss with Dr. Mayfield

## 2022-07-28 ENCOUNTER — TRANSCRIPTION ENCOUNTER (OUTPATIENT)
Age: 32
End: 2022-07-28

## 2022-07-28 VITALS
RESPIRATION RATE: 16 BRPM | HEART RATE: 82 BPM | DIASTOLIC BLOOD PRESSURE: 78 MMHG | OXYGEN SATURATION: 97 % | SYSTOLIC BLOOD PRESSURE: 126 MMHG | TEMPERATURE: 98 F

## 2022-07-28 LAB
CULTURE RESULTS: SIGNIFICANT CHANGE UP
SPECIMEN SOURCE: SIGNIFICANT CHANGE UP

## 2022-07-28 PROCEDURE — 86780 TREPONEMA PALLIDUM: CPT

## 2022-07-28 PROCEDURE — U0005: CPT

## 2022-07-28 PROCEDURE — 86870 RBC ANTIBODY IDENTIFICATION: CPT

## 2022-07-28 PROCEDURE — 86703 HIV-1/HIV-2 1 RESULT ANTBDY: CPT

## 2022-07-28 PROCEDURE — 87070 CULTURE OTHR SPECIMN AEROBIC: CPT

## 2022-07-28 PROCEDURE — 87491 CHLMYD TRACH DNA AMP PROBE: CPT

## 2022-07-28 PROCEDURE — 83615 LACTATE (LD) (LDH) ENZYME: CPT

## 2022-07-28 PROCEDURE — 86850 RBC ANTIBODY SCREEN: CPT

## 2022-07-28 PROCEDURE — 87591 N.GONORRHOEAE DNA AMP PROB: CPT

## 2022-07-28 PROCEDURE — G0463: CPT

## 2022-07-28 PROCEDURE — 36415 COLL VENOUS BLD VENIPUNCTURE: CPT

## 2022-07-28 PROCEDURE — 85384 FIBRINOGEN ACTIVITY: CPT

## 2022-07-28 PROCEDURE — 80053 COMPREHEN METABOLIC PANEL: CPT

## 2022-07-28 PROCEDURE — 84550 ASSAY OF BLOOD/URIC ACID: CPT

## 2022-07-28 PROCEDURE — 86880 COOMBS TEST DIRECT: CPT

## 2022-07-28 PROCEDURE — 85014 HEMATOCRIT: CPT

## 2022-07-28 PROCEDURE — 86900 BLOOD TYPING SEROLOGIC ABO: CPT

## 2022-07-28 PROCEDURE — 85730 THROMBOPLASTIN TIME PARTIAL: CPT

## 2022-07-28 PROCEDURE — 59025 FETAL NON-STRESS TEST: CPT

## 2022-07-28 PROCEDURE — 86769 SARS-COV-2 COVID-19 ANTIBODY: CPT

## 2022-07-28 PROCEDURE — 85610 PROTHROMBIN TIME: CPT

## 2022-07-28 PROCEDURE — U0003: CPT

## 2022-07-28 PROCEDURE — 85025 COMPLETE CBC W/AUTO DIFF WBC: CPT

## 2022-07-28 PROCEDURE — 85018 HEMOGLOBIN: CPT

## 2022-07-28 PROCEDURE — 96372 THER/PROPH/DIAG INJ SC/IM: CPT

## 2022-07-28 PROCEDURE — 59050 FETAL MONITOR W/REPORT: CPT

## 2022-07-28 PROCEDURE — 87389 HIV-1 AG W/HIV-1&-2 AB AG IA: CPT

## 2022-07-28 PROCEDURE — 86901 BLOOD TYPING SEROLOGIC RH(D): CPT

## 2022-07-28 RX ORDER — IBUPROFEN 200 MG
1 TABLET ORAL
Qty: 12 | Refills: 0
Start: 2022-07-28 | End: 2022-07-30

## 2022-07-28 RX ORDER — ACETAMINOPHEN 500 MG
2 TABLET ORAL
Qty: 24 | Refills: 0
Start: 2022-07-28 | End: 2022-07-30

## 2022-07-28 RX ORDER — IBUPROFEN 200 MG
2 TABLET ORAL
Qty: 0 | Refills: 0 | DISCHARGE

## 2022-07-28 RX ADMIN — Medication 600 MILLIGRAM(S): at 11:27

## 2022-07-28 RX ADMIN — SODIUM CHLORIDE 3 MILLILITER(S): 9 INJECTION INTRAMUSCULAR; INTRAVENOUS; SUBCUTANEOUS at 01:49

## 2022-07-28 RX ADMIN — Medication 600 MILLIGRAM(S): at 05:27

## 2022-07-28 RX ADMIN — Medication 975 MILLIGRAM(S): at 08:13

## 2022-07-28 RX ADMIN — Medication 1 TABLET(S): at 11:27

## 2022-07-28 RX ADMIN — Medication 975 MILLIGRAM(S): at 02:55

## 2022-07-28 RX ADMIN — SODIUM CHLORIDE 3 MILLILITER(S): 9 INJECTION INTRAMUSCULAR; INTRAVENOUS; SUBCUTANEOUS at 06:02

## 2022-07-28 NOTE — DISCHARGE NOTE OB - CARE PROVIDER_API CALL
Parul Alberto)  Obstetrics and Gynecology  [address] [place] [address]  Phone: [phone]  Fax: [phone]  Follow Up Time: 2 weeks

## 2022-07-28 NOTE — DISCHARGE NOTE OB - PATIENT PORTAL LINK FT
You can access the FollowMyHealth Patient Portal offered by [facility] by registering at the following website: [email or web address]. By joining [facility]’s FollowMyHealth portal, you will also be able to view your health information using other applications (apps) compatible with our system.

## 2022-07-28 NOTE — PROGRESS NOTE ADULT - SUBJECTIVE AND OBJECTIVE BOX
RODERICK NOGUEIRA is a 32y [id number] PPD#1 s/p spontaneous vaginal delivery at 36w 2/2 PPROM.    S:    No acute events overnight.   The patient has no complaints.  Pain controlled with current treatment regimen.   She is ambulating without difficulty and tolerating PO.   - flatus/-BM/+ voiding   She endorses appropriate lochia, which is decreasing.   She is breastfeeding without difficulty.   She denies fevers, chills, nausea and vomiting.   She denies lightheadedness, dizziness, palpitations, chest pain and SOB.     O:    T(C): 36.6 (22 @ 21:44), Max: 37.1 (22 @ 19:14)  HR: 93 (22 @ 21:44) (80 - 123)  BP: 142/82 (22 @ 21:44) (93/50 - 157/71)  RR: 16 (22 @ 21:44) (12 - 18)  SpO2: 96% (22 @ 21:44) (91% - 100%)    Gen: NAD, AOx3  Breast: Nontender, non-engorged   Abdomen:  Soft, non-tender, non-distended,   Uterus:  Fundus firm below umbilicus  VE:  Expectant lochia  Ext:  Non-tender and non-edematous                       
RODERICK NOGUEIRA is a 32y  now PPD#2 s/p spontaneous vaginal delivery at 36weeks gestation 2/2 PPROM with a 1st degree laceration. Pregnancy significant for IVF pregnancy.    S:    No acute events overnight.   The patient has no complaints.  Pain controlled with current treatment regimen.   She is ambulating without difficulty and tolerating PO.   + flatus/-BM/+ voiding   She endorses appropriate lochia, which is decreasing.   She is bottle-feeding without difficulty.   She denies fevers, chills, nausea and vomiting.   She denies lightheadedness, dizziness, palpitations, chest pain and SOB.     O:    T(C): 36.8 (22 @ 03:06), Max: 36.8 (22 @ 03:06)  HR: 82 (22 @ 03:06) (82 - 87)  BP: 126/78 (22 @ 03:06) (126/78 - 129/85)  RR: 16 (22 @ 03:06) (16 - 16)  SpO2: 97% (22 @ 03:06) (97% - 98%)    Gen: NAD, AOx3  CV: RRR, S1/S2 present  Pulm: CTAB  Breast: Nontender, non-engorged   Abdomen:  Soft, non-tender, non-distended, +bowel sounds  Uterus:  Fundus firm below umbilicus  VE:  Expectant lochia  Ext:  Non-tender and non-edematous                          11.2   x     )-----------( x        ( 2022 06:03 )             33.7         135  |  102  |  7.7<L>  ----------------------------<  91  3.7   |  18.0<L>  |  0.50    Ca    8.6      2022 08:27    TPro  6.4<L>  /  Alb  3.5  /  TBili  0.2<L>  /  DBili  x   /  AST  22  /  ALT  24  /  AlkPhos  156<H>

## 2022-07-28 NOTE — DISCHARGE NOTE OB - PLAN OF CARE
Please call your provider to schedule postpartum visit in 2-3 weeks. Take medications as directed, regular diet, activity as tolerated. Exclusive breast feeding for the first 6 months is recommended. Nothing per vagina for 6 weeks (incl. sex, douching, etc). If you have additional concerns, please inform your provider.

## 2022-07-28 NOTE — DISCHARGE NOTE OB - HOSPITAL COURSE
normal spontaneous vaginal delivery @ 36w secondary to PPROM. Postpartum pain is well controlled with PRN medication. She has no difficulty with ambulation, voiding or PO intake. Lab values and vital signs are within normal limits prior to discharge.

## 2022-07-28 NOTE — DISCHARGE NOTE OB - CARE PLAN
1 Principal Discharge DX:	 (normal spontaneous vaginal delivery)  Assessment and plan of treatment:	Please call your provider to schedule postpartum visit in 2-3 weeks. Take medications as directed, regular diet, activity as tolerated. Exclusive breast feeding for the first 6 months is recommended. Nothing per vagina for 6 weeks (incl. sex, douching, etc). If you have additional concerns, please inform your provider.

## 2022-07-28 NOTE — PROGRESS NOTE ADULT - ASSESSMENT
RODERICK NOGUEIRA is a 32y  now PPD#2 s/p spontaneous vaginal delivery at 36weeks gestation 2/2 PPROM with a 1st degree laceration. Pregnancy significant for IVF pregnancy.    A/P:    -Vital signs stable  -Hgb: 13.1 -> 11.2   -Voiding, tolerating PO, bowel function nml   -Advance care as tolerated   -Continue routine postpartum care and education  -Healthy female infant  -Dispo: Patient to be discharged when meeting all postpartum and postoperative milestones and pending attending approval.

## 2022-07-28 NOTE — DISCHARGE NOTE OB - MEDICATION SUMMARY - MEDICATIONS TO TAKE
I will START or STAY ON the medications listed below when I get home from the hospital:    acetaminophen 500 mg oral tablet  -- 2 tab(s) by mouth every 6 hours   -- This product contains acetaminophen.  Do not use  with any other product containing acetaminophen to prevent possible liver damage.    -- Indication: For pain    ibuprofen 600 mg oral tablet  -- 1 tab(s) by mouth every 6 hours   -- Do not take this drug if you are pregnant.  It is very important that you take or use this exactly as directed.  Do not skip doses or discontinue unless directed by your doctor.  May cause drowsiness or dizziness.  Obtain medical advice before taking any non-prescription drugs as some may affect the action of this medication.  Take with food or milk.    -- Indication: For pain    Prenatal Multivitamins with Folic Acid 1 mg oral tablet  -- 1 tab(s) by mouth once a day  -- Indication: For postpartum

## 2022-07-28 NOTE — DISCHARGE NOTE OB - NS MD DC FALL RISK RISK
For information on Fall & Injury Prevention, visit: [email or web address] OR  [email or web address] OR  https://www.cdc.gov/steadi/patient.html

## 2022-08-05 DIAGNOSIS — O42.90 PREMATURE RUPTURE OF MEMBRANES, UNSPECIFIED AS TO LENGTH OF TIME BETWEEN RUPTURE AND ONSET OF LABOR, UNSPECIFIED WEEKS OF GESTATION: ICD-10-CM

## 2022-08-05 DIAGNOSIS — Z20.822 CONTACT WITH AND (SUSPECTED) EXPOSURE TO COVID-19: ICD-10-CM

## 2022-08-05 DIAGNOSIS — Z3A.00 WEEKS OF GESTATION OF PREGNANCY NOT SPECIFIED: ICD-10-CM

## 2022-08-17 NOTE — OB RN TRIAGE NOTE - SUICIDE SCREENING QUESTION 1
Initial Comprehensive Sleep Medicine Consultation    PCP: Alfonzo Mckeon MD  Referring Provider: Alfonzo Mckeon MD    Jaxon Hernandez is a 71 year old male patient with significant history of HLD, Iron Deficiency Anemia, CAD, HTN, DM 2, Obesity who presents to the sleep clinic as a consultation for hypersomnia, he believes he is here for Narcolepsy.    He previously underwent split night polysomnography on 7/29/2002 which showed overall MILD OBSTRUCTIVE sleep apnea, AHI 11.6, O2 jona 85%, CPAP was titrated up to 7cmH2O and this is what he has been on since. He last was given a Bill machine that was under recall - he has received a replacement - data is able to be obtained from SD card showing:    Currently, he is using a NASAL PILLOW mask (unspecified).    Adherence Data:  Date: 5/19 - 8/16/2022  Usage Days: 5%  Days >4 hours: 0%  Average Hours Used: 56 minutes  Average AHI: 20.7  Average Leak: 1 min 24 seconds  Pressure: CPAP 7cmH2O    DME Provider: Audrey    In general, he DOES feel daytime sleepiness.  They usually start feeling sleepy in early evening (spouse notes he is dozing throughout day).  He DOES snore at night.  He feels he DOES breath through their mouth at night.  They HAVE been told that they stop breathing at night.  They have not noticed waking up snoring and gasping at night.  He DOES have dry mouth upon waking in the morning.  He does not feel like he toss and turn at night and he thinks that he DOES wake up frequently at night.  They feel most nights sleep IS NOT refreshing and daytime naps are refreshing.  Daytime sleepiness/ above symptoms has been WORSENING over past year or so.    They have been told they DO grind their teeth at night.  He does not complain of nasal congestion or esophageal reflux at night.  He DOES have nocturia, 3-5 times per night.     He feels he DOES have memory problems.  They HAVE had close calls  due to sleepiness - several episodes in the past years - will pull  over and take a nap.  He feels his weight has decreased 35lbs over the past year.  He feels that their mood has worsened over the past several months.     He does not complain of restless leg symptoms and he feels/has been told they DO move their legs at night.  He does not have complaints of sleep walking, sleep talking and sleep eating.  They do not have significant nightmares, vivid dreams and dream enactment.  They have not had episodes of sleep paralysis.  They deny any cataplexy symptoms (listed as positive on questionnaire but symptoms do not sound like cataplexy).  He does not report episodes of hypnagogic hallucinations and does not report hypnopompic hallucinations.    Sleep Schedule:  Bedtime: 2300  Sleep Latency: 10 seconds  Nocturnal Awakenings: 3-5 times  Return To Sleep: 5 mintues  Out of Bed: 0800  Estimated Total Sleep Time: 5-6 hours  Sleep Habits Weekends/Days Off: same  Daytime Naps: 2 per day, up to 90 minutes    [place] Sleepiness Scale: 13/24    Sleep Altering Medications: melatonin    Employment: retired    Social:  Caffeine: 3-4 cans soda throughout day  Exercise: denies  Tobacco/Nicotine: denies  Illicit/Recreational Drugs: denies  Ethanol: occasional    Sleep Related Medical Issues:  Hypertension, Coronary Artery Disease and Obesity    Surgical History, Sleep Focused:  Oral Surgery: Tonsillectomy  and Adenoidectomy   Nasal Surgery: None/Denies  Bariatric Surgery: no    Family History, Sleep Focused:  Family Members who Snore: no  Family Members with IVETTE: no  Family Members with RLS: no  Family Members with Narcolepsy: no  Family Members with Sleep Walking: no  Family Members with Insomnia: no  Family Members with Parkinson's or Dementia: no    Sleep Related Laboratory Results:  No results found for: FERR  No results found for: TRANSFERRIN  TSH (mcUnits/mL)   Date Value   07/06/2022 4.184     Carbon Dioxide (mmol/L)   Date Value   07/06/2022 28       Medical Review of Systems:  Other than  stated in HPI, a 12 point review of systems including -Eye, ENT, Cardiovascular, Respiratory, Gastro-intestinal, Genito-urinary, Musculoskeletal, Integumentary, Neurological, Psychiatric, Endocrine, Hematologic and/or Lymphatic, are negative.    Past Medical History:   Diagnosis Date   • Acute bronchitis     Feb 2014   • Acute UTI 05/18/2017    Symptomatic STAPHYLOCOCCUS EPIDERMIDIS UTI   • Anemia    • BCC (basal cell carcinoma of skin) 1985 & 1/05     left forearm and forehead    • BPH (benign prostatic hyperplasia)    • Bronchiectasis (CMS/HCC) 1972    left lower lobe, also pneumonia   • Cervical spondylosis    • Chondromalacia 9/8/2005   • Coronary artery disease involving native coronary artery of native heart without angina pectoris 05/27/2011    PTCA/Stent--3.0 X 23 Promus JOHNNY->mid-RCA 5/27/2011  LHC--significant 2-vessel CAD (LAD, Cx); Est EF 60%                                     Stents:  2.75 x 23 Promus JOHNNY->proximal LAD;2.5 x 18 Promus JOHNNY->OM1                   Angioplasty:  Mid-ramus intermedius    • DEPRESSIVE DISORDER NEC 2/19/2007   • Diabetes (CMS/HCC)     type 2   • Elevated prostate specific antigen (PSA) 7/11/06    PSA of 3.66   • Esophageal reflux    • Essential hypertension with goal blood pressure less than 140/90 2000   • GEN OSTEOARTHROS-HAND 10/29/2008   • GI bleed 2/14    Antral gastritis. Transfused 2 units   • HI MYOPIA 4/13/2007   • HYPERTROPHY PROSTATE WITH OBST 8/24/2006   • LATTICE DEGENERATION OD 5/16/2003   • Mixed hyperlipidemia 3/2/2005   • OBESITY NOS 3/15/2006   • Other specified gastritis with hemorrhage 2/7/14   • RETENTION OF URINE UNSPEC 11/22/2005   • SENILE NUCLEAR CATARACT 5/16/2003   • Sleep apnea     CPAP   • SPONDYLOS NOS W/O MYELOP 7/31/2006   • TMJ syndrome     uses splint   • Tracheobronchomalacia 4/20/2016   • Type II or unspecified type diabetes mellitus without mention of complication, uncontrolled 11/11/05   • Uncomplicated asthma 4/20/2016    +ve MCT   •  Unspecified asthma(493.90) 1985    Asthma       Past Surgical History:   Procedure Laterality Date   • Basal cell carcinoma excision  01/05/2005    Right temple   • Basal cell carcinoma excision  04/11/1985    left forearm   • Basal cell carcinoma excision  10/2015    Upper back   • Biopsy of prostate,needle/punch  8/17/06    84 gm, PSAD of 0.04, all BPH   • Bronchoscopy  05/2016    Dr. Vidal   • Cardiac catherization  05/27/2011    Dr. Velasquez @ [facility]: Significant 2-vessel CAD (LAD, Cx); Est EF 60%; status post drug-eluting stent placement in the mid LAD and 1st marginal branch, status post PTCA only of the ramus intermedius branch   • Cardiac catherization  02/12/2020    Dr. Velasquez @ [facility]: De Radha lesion in the mid 1st marginal branch that was 75% and diffuse.  This lesion was successfully treated with a stent. Patent drug-eluting stent in the proximal LAD and ostial/proximal 1st marginal branch. LVEF 60%   • Cataract extraction w/ intraocular lens  implant, bilateral  2018 April and May   • Colonoscopy  2/7/14    erossisve antral gastritis w oozing blood L colon   • Colonoscopy w/ biopsies  8/1/13    benign polyp and tubular adenoma, 3 yr f/u, Dr. Lopez   • Coronary angioplasty  05/27/2011    PTCA only of the mid-ramus intermedius (small vessel size)   • Coronary stent placement  05/27/2011    -->proximal LAD & OM1; 2.75 x 23 Promus drug-eluting stent & 2.5 x 18 Promus drug-eluting stent   • Coronary stent placement  05/27/2011    -->proximal LAD; 2.75 x 23 mm Promus drug-eluting stent   • Coronary stent placement  05/27/2011    -->OM1; 2.5 x 18 mm Promus drug-eluting stent    • Coronary stent placement  02/12/2020    -->OM1; 2.25 x 32 mm synergy drug-eluting stent   • Cystoscopy  02/21/2019    CYSTOSCOPY WITH PROSTATIC URETHRAL LIFT @ [facility]   • Extracapsular cataract removal w insert io lens prosth complex wo ecp Right 04/19/2018    SN60WF 12.0 Dr. Sravan Varela   • Extracapsular cataract removal w insert io  lens prosth complex wo ecp Left     SN60WF 11.5 Dr SHEFALI Dumont   • Fistulectomy / otomy pil cyst steve rectal  2013   • Ganglion cyst excision     • Lipoma resection      Lower back   • Removal of tonsils,<9 y/o  1958    Tonsillectomy Alone   • Remv lens material,aspiratn Right 2018    lens fragment removal - Dr shefali dumont   • Steroid injection knee      left knee cortisone injections   • Tonsillectomy and adenoidectomy     • Upper gastrointestinal endoscopy  14   • [place] tooth extraction  1975       ALLERGIES:   Allergen Reactions   • Paxil [Paroxetine Hydrochloride]      Esophageal spasms   • Robitussin Cough/Congestion [Dextromethorphan Hydrochloride]      esophogeal spasms   • Seasonal Other (See Comments)     Seasonal spring and fall   • Thimerosal Other (See Comments)     Eye redness       No outpatient medications have been marked as taking for the 22 encounter (Hospital Encounter) with Shree Morales MD.       Social History     Tobacco Use   • Smoking status: Former Smoker     Packs/day: 0.80     Years: 2.00     Pack years: 1.60     Types: Cigarettes     Quit date: 1970     Years since quittin.6   • Smokeless tobacco: Never Used   • Tobacco comment: IN COLLEGE 2 year .75 pack a day   Vaping Use   • Vaping Use: never used   Substance Use Topics   • Alcohol use: Yes     Comment: rare one beer every 90 days   • Drug use: No       Family History   Problem Relation Age of Onset   • Ophthalmology Paternal Grandmother         glaucoma   • Hypertension Mother    • Congestive Heart Failure Father    • Heart disease Father    • Genitourinary Other         no   • Cancer Paternal Uncle         colon       Examination:  Visit Vitals  Pulse 66   Temp 97.9 °F (36.6 °C)   Ht 5' 9\" (1.753 m)   Wt 113.4 kg (250 lb)   SpO2 97%   BMI 36.92 kg/m²     Estimated body mass index is 36.92 kg/m² as calculated from the following:    Height as of this encounter: 5' 9\" (1.753 m).    Weight as  of this encounter: 113.4 kg (250 lb).  GENERAL:  Well nourished pt in NAD. Awake, Alert and oriented to time, place, person.  SKIN: Warm and moist. No rash.   FACE and NECK: with moderate excessive soft tissue. Circumference is Neck Size: 18 Inches (08/17/22 1017) inches  MOUTH: Dental occlusion normal. Tongue is enlarged for oral cavity with some scalloping  Palate is enlarged and low hanging, without tonsillar hypertrophy, without lateral narrowing  NOSE: The patient breathes through his nose without alar collapse, turbinates normal.  MALLAMPATI: 4  CARDIAC: normal, regular rate and rhythm, no murmur noted  PULMONARY: clear to auscultation  ABDOMEN:  Soft, non tender. Normoactive bowel sounds. No hepatosplenomegaly.  MSK: No skeletal deformities. Full ROM in upper and lower extremities. No LE edema.  NEURO: CN II-XII grossly intact. Full strength in upper and lower extremities B/L.    Assessment and Plan:    Hypersomnia  - although his ESS is technically normal he does complain of significant daytime sleepiness  - likely due to underlying sleep disordered breathing, will evaluate and treat as below  - if symptoms persist could consider further Narcolepsy evaluation  - discussed risk of driving while sleepy and higher likelihood of accidents/injury to self and/or others, strongly advised never to drive while sleepy, discussed ways to mitigating sleepiness and if needed pulling over and taking a short nap      Obstructive Sleep Apnea, MILD (Historical)  - overall based on download it appears he has at least MODERATE sleep apnea (unspeified type), this is likely playing a very large role in his above symptoms  - he is also using PAP for less than 1 hour per night, advised to increase usage  - will evaluate with in lab polysomnography, split night if neccessary  - discussed pathophysiology, diagnosis, and treatment of sleep apnea and questions answered    Hypertension, Coronary Artery Disease and Diabetes  Mellitus  Untreated or inadequate control of sleep disordered breathing can lead to:  - increasing risk of adverse cardiovascular events (MI, CVA, etc) and arrhythmias  - worsening insulin resistance leading to higher blood sugars  - worsening of existing hypertensive problems    Obesity Class II  - Estimated body mass index is 36.92 kg/m² as calculated from the following:    Height as of this encounter: 5' 9\" (1.753 m).    Weight as of this encounter: 113.4 kg (250 lb).  - discussed role of obesity in IVETTE and need for weight loss    In general in addition to above, the patient was counseled on various facets of sleep including:  - the natural history, physiology, consequences, and treatment options for sleep apnea.  - the risk of driving, operating machinery, and/or doing activities at elevated heights when sleepy.  - regarding sleep hygiene.  - to avoid alcohol, sedatives, smoking, and hypnotics given their adverse effect on sleep and breathing.  - the need to achieve and maintain a healthy weight.  - diagnosis of sleep apnea, treatment options and expectations for compliance.  - if significant sleep apnea is observed on PSG, PAP therapy may be initiated on the same night or may require additional titration study.    Follow up: after polysomnography    A total of 47 minutes were spent on today's clinic visit.  This time includes review of the medical record, history and physical, documentation, counseling and coordination of care.    Shree Morales MD  Sleep Medicine, Obesity Medicine, Internal Medicine  [facility]  Date of Service:  8/17/2022    Thank you for allowing me to participate in the care of your patient.  A copy of this report will be forwarded to the referring provider, Alfonzo Mckeon MD.    Note to patient: The 21st Century Cures Act makes medical notes like these available to patients in the interest of transparency; however, be advised this is a medical document. It  is intended as peer to peer communication. It is written in medical language and may contain abbreviations or verbiage that are unfamiliar. It may appear blunt or direct. Medical documents are intended to carry relevant information, facts as evident to the provider and the clinical opinion of the practitioner.        No

## 2022-09-08 ENCOUNTER — RESULT REVIEW (OUTPATIENT)
Age: 32
End: 2022-09-08

## 2022-09-26 ENCOUNTER — APPOINTMENT (OUTPATIENT)
Dept: GYNECOLOGIC ONCOLOGY | Facility: CLINIC | Age: 32
End: 2022-09-26

## 2022-09-26 PROCEDURE — 76857 US EXAM PELVIC LIMITED: CPT | Mod: 59

## 2022-09-26 PROCEDURE — 99212 OFFICE O/P EST SF 10 MIN: CPT | Mod: 25

## 2022-09-26 PROCEDURE — 93976 VASCULAR STUDY: CPT | Mod: 59

## 2022-09-26 PROCEDURE — 76830 TRANSVAGINAL US NON-OB: CPT | Mod: 59

## 2022-09-26 RX ORDER — FLUTICASONE PROPIONATE 50 UG/1
50 SPRAY, METERED NASAL
Qty: 16 | Refills: 0 | Status: ACTIVE | COMMUNITY
Start: 2022-07-19

## 2022-09-26 RX ORDER — PREDNISONE 20 MG/1
20 TABLET ORAL
Qty: 10 | Refills: 0 | Status: ACTIVE | COMMUNITY
Start: 2022-07-25

## 2022-09-26 RX ORDER — AZITHROMYCIN 250 MG/1
250 TABLET, FILM COATED ORAL
Qty: 6 | Refills: 0 | Status: ACTIVE | COMMUNITY
Start: 2022-06-14

## 2022-09-26 RX ORDER — HUMAN RHO(D) IMMUNE GLOBULIN 300 UG/1
1500 INJECTION, SOLUTION INTRAMUSCULAR
Qty: 1 | Refills: 0 | Status: ACTIVE | COMMUNITY
Start: 2022-05-09

## 2022-09-26 NOTE — END OF VISIT
[FreeTextEntry3] : RTC in 6 months for follow up US\par  now and in 6 months [FreeTextEntry2] : Sosa Zamora MA was present the entire duration of the patient interaction and gynecological exam.\par

## 2022-09-26 NOTE — ASSESSMENT
[FreeTextEntry1] : This 32 y/ with a hx of serous borderline tumor of the ovary since 4/2019 s/p ex-lap, LSO, omentectomy, b/l PPALND, multiple peritoneal biopsies. 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 TVUS today normal. Right ovary with 2.4 simple cyst. Normal uterus, no concerning findings.  ordered but as patient on menstrual cycle recommend waiting 2 weeks prior to getting it done. \par \par We discussed that she had a cyst on right ovary 1 year ago which was the same size likely physiologic. No concern and recommend 6 month follow up as she is now 3 years after diagnosis. \par

## 2022-09-26 NOTE — REASON FOR VISIT
[FreeTextEntry1] : [place] Location \par \par [facility] [place]. [phone]\par [address] [place] [address]

## 2022-09-26 NOTE — PHYSICAL EXAM
[Chaperone Present] : A chaperone was present in the examining room during all aspects of the physical examination [FreeTextEntry1] : Sosa Zamora MA was present the entire duration of the patient interaction and gynecological exam. [Fully active, able to carry on all pre-disease performance without restriction] : Status 0 - Fully active, able to carry on all pre-disease performance without restriction

## 2022-09-26 NOTE — HISTORY OF PRESENT ILLNESS
[FreeTextEntry1] : This 33 y/o previous patient of Dr. Romeo Alberto with a hx of serous borderline tumor of the ovary since 4/2019 s/p ex-lap, LSO, omentectomy, b/l PPALND, multiple peritoneal biopsies. 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 Patient was last seen August 2021 for an ultrasound which revealed Uterus WNL 8.4 x 5.5 x 4.5cm. Endometrial thickness 12.0mm. Right ovary measuring 4.8 x 4.5 x 3.0cm. RI.52. Simple cyst 2.4 x 1.5 x 2.0cm. 2.1 x 2.0 x 2.2 heterogenous area. LTO absent. \par \par  Dr. Alberto discussed with patient that her ultrasound showed a simple cyst which is not concerning. Patient had reported having an egg retrieval in which they were able to retrieve 5 eggs. He advised the patient that her elevated Ca125 was probably from IVF and should proceed with repeat . Patient asked if she is cleared to get pregnant. I advised the patient we will wait until her repeat results are in. Otherwise patient will follow up in December for a follow up ultrasound. Ca125 was 21 on 9/3/21\par \par Patient called the office on 1/19/22 to inform our office of positive pregnancy test due August 2022. She returns to the office today for a routine svl visit and Ca125 script

## 2022-10-21 LAB — CANCER AG125 SERPL-ACNC: 12 U/ML

## 2022-11-01 NOTE — OB RN DELIVERY SUMMARY - NSABNHEARTRATE_OBGYN_ALL_OB
PEDIATRIC TRANSPLANT CARDIOLOGY NOTE    11/01/2022    Cruzito Ann MD  [address] [facility] [id number]    Dear Dr. Ann:    CHIEF COMPLAINT: Orthotopic heart transplant    HISTORY OF PRESENT ILLNESS: James is a 15 y.o. 10 m.o. male who presents to transplant cardiology clinic for ongoing management in transplant cardiology.     Born with TAPVR [place] [facility].  James underwent orthotopic heart transplant on February 3, 2019 due to dilated cardiomyopathy and ventricular tachycardia. This heart transplant was complicated by hemodynamically significant and severe acute cellular rejection (grade III) requiring ECMO. He had a prolonged hospitalization complicated by compartment syndrome of the right leg and wound infection at the site of his previous thoracotomy site. Unfortunately, James had multiple readmissions for heart failure without evidence of rejection. He was relisted status 1 B due to severe distal coronary disease and symptomatic heart failure. He was managed as an outpatient on milrinone but ultimately required retransplantation on 9/26/2022. His post transplant course was complicated by acute on chronic kidney disease and prolonged pleural effusion/chest tube drainage. He was discharged home on 10/26/2022.    Interval history:  Since discharge home he and his mother feel that he is doing really well. He has good energy and is enjoying eating at their restaurant. He is eating well. He denies any nausea, vomitting, diarrhea, constipation, abdominal pain or swelling. No shortness of breath, chest pain, or palpitations. He is on Seroquel. MMF dose was increased last time.     Transplant history  Transplant Date: 9/26/2022 (Heart) #2  Underlying cardiac diagnosis: s/p OHT with CAD and symptomatic heart failure  History of mechanical circulatory support: None for this graft (see below)  Transplant center: [facility]      Transplant Date:  2/3/2019 (Heart) #1  Underlying cardiac diagnosis: Dilated cardiomyopathy, TAPVR w inferior vertical vein  History of mechanical circulatory support: None prior to transplant but was on ECMO for severe rejection September 2020.  Transplant center: [facility]    Rejection  History of rejection: yes, September 21, 2020 with Grade III cellular rejection. May 19/2021 with mild AMR.   10/24/21- Admitted for HF symptoms. Had been given oral pred by PCP for 3 days prior for cough. Found to have decreased biventricular systolic function. Biopsy was negative, likely due to pre-treatment of steroids. He received IV pulse steroids and was tapered to oral steroids. Sirolimus started for additional coverage.     Infection  History of infection:  Yes - left thoracotomy wound infection related to ECMO September 2020, pseudomonas.  MSSA from calf wound.    Cardiac allograft vasculopathy: Yes (transplant #1)  Severe, diffuse small vessel disease seen on cath 11/20/21 with functional upgrading    Last cardiac catheterization:  10/10/2022    Baseline Immunosuppression:  Tacrolimus and MMF    Medication compliance addressed  Missed doses: None  Late doses (>15 minutes): None  Knows medicine names:Patient-- All meds  Knows medication doses:  Yes  Diagnosis of diabetes mellitus post transplant May 2019 - followed by endocrine    The review of systems is as noted above. It is otherwise negative for other symptoms related to the general, neurological, psychiatric, endocrine, gastrointestinal, genitourinary, respiratory, dermatologic, musculoskeletal, hematologic, and immunologic systems.    PAST MEDICAL HISTORY:   Past Medical History:   Diagnosis Date    CHF (congestive heart failure)     Coronary artery disease     Diabetes mellitus     Dilated cardiomyopathy 2019    Encounter for blood transfusion     Organ transplant     TAPVR (total anomalous pulmonary venous return) 2004     FAMILY HISTORY:   Family History    Problem Relation Age of Onset    Heart disease Paternal Grandfather     Melanoma Neg Hx     Psoriasis Neg Hx     Lupus Neg Hx     Eczema Neg Hx      SOCIAL HISTORY:   Social History     Socioeconomic History    Marital status: Single   Tobacco Use    Smoking status: Never    Smokeless tobacco: Never   Substance and Sexual Activity    Alcohol use: Never    Drug use: Never    Sexual activity: Never   Social History Narrative    Lives at home with parents and siblings. Attends [employer] fall 22       ALLERGIES:  Review of patient's allergies indicates:   Allergen Reactions    Measles (rubeola) vaccines      No live virus vaccines in transplant recipients    Nsaids (non-steroidal anti-inflammatory drug)      Renal failure with transplant medications    Varicella vaccines      Live virus vaccine    Grapefruit      Interacts with transplant medications       MEDICATIONS:    Current Outpatient Medications:     aspirin 81 MG Chew, Take 1 tablet (81 mg total) by mouth once daily., Disp: 30 tablet, Rfl: 11    blood-glucose meter,continuous (DEXCOM G6 ) Misc, For use with dexcom continuous glucose monitoring system, Disp: 1 each, Rfl: 1    blood-glucose sensor (DEXCOM G6 SENSOR) Cely, Use for continuous glucose monitoring;change as needed up to 10 day wear., Disp: 3 each, Rfl: 12    blood-glucose transmitter (DEXCOM G6 TRANSMITTER) Cely, Use with dexcom sensor for continuous glucose monitoring; change as indicated when batttBanner life ends up to 90 day use, Disp: 2 Device, Rfl: 4    DULoxetine (CYMBALTA) 60 MG capsule, Take 1 capsule (60 mg total) by mouth once daily., Disp: 30 capsule, Rfl: 11    insulin aspart U-100 (NOVOLOG U-100 INSULIN ASPART) 100 unit/mL injection, Place 100 units into pump every other day., Disp: 10 mL, Rfl: 3    insulin pump cart,automated,BT (OMNIPOD 5 G6 PODS, GEN 5,) Crtg, 1 Device by subcutaneous (via wearable injector) route every other day., Disp: 15 each, Rfl: 2    " magnesium oxide (MAG-OX) 400 mg (241.3 mg magnesium) tablet, Take 1 tablet (400 mg total) by mouth 3 (three) times daily., Disp: 60 tablet, Rfl: 5    melatonin (MELATIN) 3 mg tablet, Take 2 tablets (6 mg total) by mouth nightly., Disp: 30 tablet, Rfl: 0    mycophenolate (CELLCEPT) 500 mg Tab, Take 3 tablets (1,500 mg total) by mouth 2 (two) times daily., Disp: 180 tablet, Rfl: 11    pantoprazole (PROTONIX) 40 MG tablet, Take 1 tablet (40 mg total) by mouth once daily., Disp: 30 tablet, Rfl: 11    pravastatin (PRAVACHOL) 20 MG tablet, Take 1 tablet (20 mg total) by mouth once daily., Disp: 90 tablet, Rfl: 3    predniSONE (DELTASONE) 10 MG tablet, Take 1 tablet (10 mg total) by mouth once daily., Disp: 30 tablet, Rfl: 2    spironolactone (ALDACTONE) 25 MG tablet, Take 1 tablet (25 mg total) by mouth once daily., Disp: 30 tablet, Rfl: 11    tacrolimus (PROGRAF) 1 MG Cap, Use if needed for dose adjustments based on tacrolimus level. 100 caps/30 days Take  5 mg capsule and 1 mg capsule twice a day (total 6 mg /dose), Disp: 100 capsule, Rfl: 5    tacrolimus (PROGRAF) 5 MG Cap, Take 1 capsule (5 mg total) by mouth every 12 (twelve) hours. Take  5 mg capsule and 1 mg capsule twice a day (total 6 mg /dose), Disp: 60 capsule, Rfl: 11    tadalafil (ADCIRCA) 20 mg Tab, Take 1 tablet (20 mg total) by mouth once daily., Disp: 30 tablet, Rfl: 11    torsemide (DEMADEX) 20 MG Tab, Take 3 tablets (60 mg total) by mouth 3 (three) times daily., Disp: 180 tablet, Rfl: 1    valGANciclovir (VALCYTE) 450 mg Tab, Take 1 tablet (450 mg total) by mouth once daily., Disp: 30 tablet, Rfl: 11      PHYSICAL EXAM:   Vitals:    11/01/22 0838 11/01/22 0839   BP: 129/77 (!) 122/58   BP Location: Left arm Right leg   Patient Position: Sitting Lying   BP Method: Small (Automatic) Small (Automatic)   Pulse: 110    SpO2: 100%    Weight: 52.9 kg (116 lb 10 oz)    Height: 5' 8.5" (1.74 m)    BP (!) 122/58 (BP Location: Right leg, Patient Position: Lying, " "BP Method: Small (Automatic))   Pulse 110   Ht 5' 8.5" (1.74 m)   Wt 52.9 kg (116 lb 10 oz)   SpO2 100%   BMI 17.47 kg/m²   Wt Readings from Last 3 Encounters:   11/01/22 52.9 kg (116 lb 10 oz) (5 %, Z= -1.65)*   10/28/22 52 kg (114 lb 12 oz) (4 %, Z= -1.78)*   10/26/22 52 kg (114 lb 10.2 oz) (4 %, Z= -1.78)*     * Growth percentiles are based on CDC (Boys, 2-20 Years) data.     Ht Readings from Last 3 Encounters:   11/01/22 5' 8.5" (1.74 m) (39 %, Z= -0.29)*   10/28/22 5' 8.39" (1.737 m) (37 %, Z= -0.33)*   09/26/22 5' 7.52" (1.715 m) (27 %, Z= -0.63)*     * Growth percentiles are based on CDC (Boys, 2-20 Years) data.     Body mass index is 17.47 kg/m².  2 %ile (Z= -2.08) based on CDC (Boys, 2-20 Years) BMI-for-age based on BMI available as of 11/1/2022.  5 %ile (Z= -1.65) based on CDC (Boys, 2-20 Years) weight-for-age data using vitals from 11/1/2022.  39 %ile (Z= -0.29) based on [facility] (Boys, 2-20 Years) Stature-for-age data based on Stature recorded on 11/1/2022.      Physical Examination:  Constitutional: Appears well-developed. Non-toxic.   HENT:   Nose: Nose normal.   Mouth/Throat: Mucous membranes are moist. No oral lesions. No thrush. No tonsillar hypertrophy.   Eyes: Conjunctivae and EOM are normal.   Neck: Neck supple.   Cardiovascular: Tachycardic, regular rhythm, S1 normal and split S2. 2/6 systolic ejection murmur at the LUSB  Pulmonary/Chest: Effort normal and air entry normal bilaterally.  Well healing sternotomy incision and chest tube sites.  Abdominal: Soft. Bowel sounds are normal. Liver 1-2 cm below the RCM There is no tenderness.   Neurological: Alert. Exhibits normal muscle tone.   Skin: Skin is warm and dry. Capillary refill takes less than 2 seconds. Turgor is normal. No cyanosis.   Extremities:  Left leg: No significant tenderness, edema, or deformity.  There is no erythema or warmth.  In the right leg incisions are completely healed. Right calf smaller than left. No tenderness or " significant erythema. There is no increased warmth.  Excellent distal pulses are noted.  There is no edema in the feet.  Extensive scarring on the right calf noted.  No evidence of infection.     I personally reviewed and interpreted the following studies and tests:  EKG  Sinus tachycardia,     Echocardiogram today:  Normal qualitative function, EF, and FS. Septal dyskinesis and mildly reduced GLS. Moderate TR. No effusion.    Lab Results   Component Value Date    WBC 6.35 10/28/2022    HGB 10.0 (L) 10/28/2022    HCT 33.3 (L) 10/28/2022    MCV 89 10/28/2022     10/28/2022       CMP  Sodium   Date Value Ref Range Status   10/28/2022 144 136 - 145 mmol/L Final     Potassium   Date Value Ref Range Status   10/28/2022 4.4 3.5 - 5.1 mmol/L Final     Chloride   Date Value Ref Range Status   10/28/2022 106 95 - 110 mmol/L Final     CO2   Date Value Ref Range Status   10/28/2022 29 23 - 29 mmol/L Final     Glucose   Date Value Ref Range Status   10/28/2022 86 70 - 110 mg/dL Final     BUN   Date Value Ref Range Status   10/28/2022 31 (H) 5 - 18 mg/dL Final     Creatinine   Date Value Ref Range Status   10/28/2022 0.9 0.5 - 1.4 mg/dL Final     Calcium   Date Value Ref Range Status   10/28/2022 9.4 8.7 - 10.5 mg/dL Final     Total Protein   Date Value Ref Range Status   10/28/2022 6.6 6.0 - 8.4 g/dL Final     Albumin   Date Value Ref Range Status   10/28/2022 3.6 3.2 - 4.7 g/dL Final     Total Bilirubin   Date Value Ref Range Status   10/28/2022 0.5 0.1 - 1.0 mg/dL Final     Comment:     For infants and newborns, interpretation of results should be based  on gestational age, weight and in agreement with clinical  observations.    Premature Infant recommended reference ranges:  Up to 24 hours.............<8.0 mg/dL  Up to 48 hours............<12.0 mg/dL  3-5 days..................<15.0 mg/dL  6-29 days.................<15.0 mg/dL       Alkaline Phosphatase   Date Value Ref Range Status   10/28/2022 255 (H) 59  164  U/L Final     AST   Date Value Ref Range Status   10/28/2022 48 (H) 10 - 40 U/L Final     ALT   Date Value Ref Range Status   10/28/2022 25 10 - 44 U/L Final     Anion Gap   Date Value Ref Range Status   10/28/2022 9 8 - 16 mmol/L Final     eGFR if    Date Value Ref Range Status   07/26/2022 SEE COMMENT >60 mL/min/1.73 m^2 Final     eGFR if non    Date Value Ref Range Status   07/26/2022 SEE COMMENT >60 mL/min/1.73 m^2 Final     Comment:     Calculation used to obtain the estimated glomerular filtration  rate (eGFR) is the CKD-EPI equation.   Test not performed.  GFR calculation is only valid for patients   18 and older.       Lab Results   Component Value Date    CHOL 157 06/18/2022    CHOL 148 05/18/2021    CHOL 194 04/21/2020     Lab Results   Component Value Date    HDL 33 (L) 06/18/2022    HDL 46 05/18/2021    HDL 51 04/21/2020     Lab Results   Component Value Date    LDLCALC 92.4 06/18/2022    LDLCALC 78.4 05/18/2021    LDLCALC 111.2 04/21/2020     Lab Results   Component Value Date    TRIG 61 10/20/2022    TRIG 60 10/17/2022    TRIG 55 10/13/2022     Lab Results   Component Value Date    CHOLHDL 21.0 06/18/2022    CHOLHDL 31.1 05/18/2021    CHOLHDL 26.3 04/21/2020     Tacrolimus Lvl   Date Value Ref Range Status   10/28/2022 6.2 5.0 - 15.0 ng/mL Final     Comment:     Testing performed by a chemiluminescent microparticle   immunoassay on the [employer].       MPA   Date Value Ref Range Status   10/28/2022 1.2 1.0 - 3.5 mcg/mL Final     Magnesium   Date Value Ref Range Status   10/28/2022 1.5 (L) 1.6 - 2.6 mg/dL Final     EBV DNA, PCR   Date Value Ref Range Status   06/13/2022 Undetected Undetected IU/mL Final     Comment:     Result in log IU/mL is Undetected.    -------------------ADDITIONAL INFORMATION-------------------  The quantification range of this assay is 35 to 100,000,000   IU/mL (1.54 log to 8.00 log IU/mL). Testing was performed   using the toney EBV  test ([employer]   with the toney 6800 System.    Test Performed by:  [facility]  [address] [place] [address]  : Santos Mcfadden M.D. Ph.D.; CLIA# [id number]       Cytomegalovirus DNA   Date Value Ref Range Status   10/26/2022 Not Detected Not Detected Final     Class I Antibody Comments - Luminex   Date Value Ref Range Status   09/26/2022 WEAK---[id number]), [id number]([id number])  Final     Comment:     These tests are not cleared or approved by the U.S. FDA, but such   approval is not required since this laboratory is certified by CLIA   (#[id number]) and the American Society for Histocompatibility and   Immunogenetics (70-8-IW-02-01) to perform high complexity testing.    [facility]   Laboratory is under the direction of SHERI Jones MD, DILLON.   Details of test procedures may be obtained by calling the Laboratory   at  [phone].  Test performed using immunofluorescent detection - Luminex. Class I   and class II beads have been EDTA treated. This test was developed,   and its performance characteristics determined by the [facility].       Class II Antibody Comments - Luminex   Date Value Ref Range Status   09/26/2022 WEAK----[id number])  Final     Comment:     These tests are not cleared or approved by the U.S. FDA, but such   approval is not required since this laboratory is certified by CLIA   (#[id number]) and the American Society for Histocompatibility and   Immunogenetics (29-9-VF-02-01) to perform high complexity testing.    [facility]   Laboratory is under the direction of SHERI Jones MD, DILLON.   Details of test procedures may be obtained by calling the Laboratory   at  [phone].  These tests are not cleared or approved by the U.S. FDA, but such   approval is  not required since this laboratory is certified by CLIA   (#[id number]) and the American Society for Histocompatibility and   Immunogenetics (83-4-WH-02-01) to perform high complexity testing.    [facility]   Laboratory is under the direction of SHERI Jones MD, DILLON.   Details of test procedures may be obtained by calling the Laboratory   at  [phone].  Test performed using immunofluorescent detection - Luminex. Class I   and class II beads have been EDTA treated. This test was developed,   and its performance characteristics determined by the [facility].       No results found for: SIROLIMUS        Assessment and Plan:   James Helm is a 15 y.o. male with:  1.  History of TAPVR s/p repair as a baby  2.  Orthotopic heart transplant on February 3, 2019 due to dilated cardiomyopathy  3.  Re-heart transplant on September 26, 2022  due to CAD and symptomatic heart failure  4.  Post transplant diabetes mellitus  5.  Acute systolic heart failure, severe cell mediated rejection, grade 3R (9/22/20) with hemodynamic compromise, repeat biopsy negative (10/6/20).   - V-A ECMO 9/23 (right foot perfusion catheter)  - LV vent 9/24, removed 9/27  - s/p ECMO decannulation (9/30)  6. AMR on cath 5/19/21 on steroid course. Repeat biopsy on 7/1/21, negative for rejection.  Biopsy negative rejection 10/24/21- treated with steroids.  Repeat Biopsy 2/23/22 negative for rejection.  7. Severe small vessel coronary disease noted on cath 11/30/21.  8. Acute on chronic kidney disease  - stable Cr and improving BUN  9. History of atrial tachycardia, treated in hospital June 2022 with amiodarone load.  Now on maintenance dose.  10. Compartment syndrome of right lower leg- s/p fasciotomy 10/3, closure 10/9  - Abscess in right calf prompting hospitalization January 4th through January 15, 2021.  Drain placed January 6,  2021 through January 22, 2021.  On IV antibiotics until January 29, 2021.    - Incision and Drainage of R calf on 2/2/21, wound vac application with subsequent changes. Was on IV antibiotics until 3/16/21.   - Persistent right foot pain  11. S/p bedside wound debridement and wound vac placement to left thoracotomy site (10/11/20) - pseudomonas.  Resolved.   12. Peripheral neuropathy per PMR (secondary to tacrolimus)    Alicja is now s/p retransplantation on 9/26/2022. We are following his weights and renal function closely as its has been difficult to achieve euvolemia without significant diuretic requirement and subsequent kidney injury. If his fluid balance continues difficult to manage as an outpatient may consider CardioMEMS placement at the time of his next cath.    Plan:    Immunosuppression:  -S/p induction with ATG x 5 days, Solumedrol, and IvIG  -Prednisone 10 mg qD, go to 5 mg  -Tacrolimus 6 mg BID, goal 8-12  -MMF 1500 mg BID (increased 10/28, max dose), goal 2-4    CV:  -Tadalafil 20 mg qD  -Torsemide 40mg PO TID, weight increasing, will likely add a dose of HCTZ depending on renal numbers  -Echo and ECG q Tues/Fri  - Aldactone  -Tentative outpatient CardioMEMS placement to help with fluid management as an outpatient   -Last cath: 10/10/2022. Next in ~1 month will work on scheduling    CAV PPX:  -Pravastatin 20mg daily  -ASA daily     FENGI:  -Mg Goal >1.2, continue mag sups     ENDO:  -Close follow-up with endocrinology    Neuro/psych:  -Continue Cymbalta for Adjustment disorder with depressed mood and chronic pain  -Stop seroquel, given no perceived benefit    Pulm:  - Abnormal spirometry  -weight is up a little today, has a long history of pleural effusion, will repeat CXR today.    Musc:  -PM&R following    Heme/ID:  - Pretransplant CMV and EBV positive  -Nystatin swish and swallow qid for 1 month- to stop today  - PCP prophylaxis: Received pentamadine on 10/19 due to BULL, will likely transition back  to bactrim  -CMV prophylaxis - donor and recipient CMV positive.  Total 3 months therapy:  Continue -Valcyte 450 mg daily (renally dosed)   -Hep B surface Ab- given Hep B on 9/9/22, will need another dose 10/8, but now s/p transplant so will hold off for a few months.   -Hep C RNA from 10/28 came back positive, discussing with ID, sent repeat this morning.     Derm:   -Multiple warts - followed by Dermatology.     - Yearly derm done, multiple warts removed (11/9/21)  - Apply sunscreen to exposed areas every day     Genetics:  Cardiomyopathy panel with variant of unknown significance.  Family aware that the recommendation is that both parents and the kids echos.     Activity:  Scuba Diving restrictions due to denervated heart and pressure changes.     Dentist:  He saw his dentist this year.        Sincerely,    Zayda Fregoso MD  Pediatric Cardiologist  Pediatric Heart Transplant and Heart Failure  [facility]  [address] [place] [id number]    Office            Abnormal Fetal Heart Rate Category II

## 2022-12-14 NOTE — ASU PREOP CHECKLIST - BMI (KG/M2)
44 Low Dose Naltrexone Counseling- I discussed with the patient the potential risks and side effects of low dose naltrexone including but not limited to: more vivid dreams, headaches, nausea, vomiting, abdominal pain, fatigue, dizziness, and anxiety.

## 2023-03-07 LAB — CANCER AG125 SERPL-ACNC: 16 U/ML

## 2023-03-13 ENCOUNTER — APPOINTMENT (OUTPATIENT)
Dept: GYNECOLOGIC ONCOLOGY | Facility: CLINIC | Age: 33
End: 2023-03-13
Payer: COMMERCIAL

## 2023-03-13 PROCEDURE — 99213 OFFICE O/P EST LOW 20 MIN: CPT | Mod: 25

## 2023-03-13 PROCEDURE — 76830 TRANSVAGINAL US NON-OB: CPT | Mod: 59

## 2023-03-13 PROCEDURE — 76857 US EXAM PELVIC LIMITED: CPT | Mod: 59

## 2023-03-13 NOTE — ASSESSMENT
[FreeTextEntry1] : This 32 y/ with a hx of serous borderline tumor of the ovary since 4/2019 s/p ex-lap, LSO, omentectomy, b/l PPALND, multiple peritoneal biopsies. 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

## 2023-03-13 NOTE — PHYSICAL EXAM
[Normal] : Mood and affect: Normal [Chaperone Present] : A chaperone was present in the examining room during all aspects of the physical examination [FreeTextEntry1] : Roslyn Lockhart MA was present the entire duration of the patient interaction and gynecological exam. [Fully active, able to carry on all pre-disease performance without restriction] : Status 0 - Fully active, able to carry on all pre-disease performance without restriction

## 2023-03-13 NOTE — PROGRESS NOTE ADULT - PROBLEM/PLAN-1
Jamilah Gregory is a 78 y.o. female who presents to the office today for the following:    Chief Complaint   Patient presents with    Hypertension       Past Medical History:   Diagnosis Date    Anemia 6/24/2020    Anxiety 6/24/2020    Arthritis 6/24/2020    Breast cancer ([facility] [address] 6/24/2020    GERD (gastroesophageal reflux disease) 6/24/2020    Heart disease 6/24/2020    Hyperlipidemia 6/24/2020    Hypertension 6/24/2020    Sleep disorder 6/24/2020    Stroke (cerebrum) ([facility] [address] 6/24/2020 2012       Past Surgical History:   Procedure Laterality Date    HX BREAST LUMPECTOMY  1996    HX TUBAL LIGATION      VA REVISION AMBROSE-IMPLANT CAPSULE BREAST          Family History   Problem Relation Age of Onset    Hypertension Mother     Hypertension Sister     Alzheimer's Disease Sister     Cancer Brother         Social History     Tobacco Use    Smoking status: Former    Smokeless tobacco: Never   Substance Use Topics    Alcohol use: Not Currently    Drug use: Never        HPI  Patient here today for follow up of chronic conditions with PMH of hyperlipidemia, GERD, hypertension, cva, anxiety, CAD, MV regurgitation, osteoporosis,vitamin d deficiency , copd, recurrent falls,breast cancer and L1 fracture. Does see cardiology at [facility] but not currently seeing psychiatrist due to prior left. Does report today that she has had a fall in the past 2 months. Complains of right hip pain which has been ongoing since the fall. States that she was carrying some things out of door when she tripped and fell onto her side. Denies any pain in her back. No numbness or weakness in right lower extremity. Also is having pain in left wrist. Reports she felt it pop out of place when she was turning hand over to carry something. Hurts to  or make a fist.  Also no numbness or weakness in left hand hand.     Current Outpatient Medications on File Prior to Visit   Medication Sig    atorvastatin (LIPITOR) 20 mg tablet TAKE 1 TABLET BY MOUTH EVERY DAY omeprazole (PRILOSEC) 40 mg capsule TAKE 1 CAPSULE BY MOUTH EVERY DAY    metoprolol succinate (TOPROL-XL) 25 mg XL tablet TAKE 1 TABLET BY MOUTH TWO TIMES A DAY. ALPRAZolam (XANAX) 1 mg tablet TAKE 1 TAB BY MOUTH 2 TIMES DAILY AS NEEDED FOR ANXIETY FOR UP TO 30 DAYS. MAX DAILY AMOUNT 2 MG    albuterol (PROVENTIL HFA, VENTOLIN HFA, PROAIR HFA) 90 mcg/actuation inhaler Take 2 Puffs by inhalation every six (6) hours as needed for Wheezing. denosumab (PROLIA) 60 mg/mL injection 1 mL by SubCUTAneous route every 6 months.    melatonin 3 mg tablet Take 9 mg by mouth nightly as needed for Insomnia. aspirin delayed-release 81 mg tablet Take  by mouth daily. [DISCONTINUED] mirtazapine (REMERON) 7.5 mg tablet Take 7.5 mg by mouth nightly. [DISCONTINUED] cholecalciferol (Vitamin D3) 25 mcg (1,000 unit) cap Take 1,000 Units by mouth daily. (Patient not taking: Reported on 3/30/2022)     No current facility-administered medications on file prior to visit. Medications Ordered Today   Medications    cholecalciferol (Vitamin D3) (2,000 UNITS /50 MCG) cap capsule     Sig: Take 1 Capsule by mouth daily. Dispense:  90 Capsule     Refill:  1        Review of Systems   Constitutional:  Positive for malaise/fatigue. Negative for chills, fever and weight loss. HENT: Negative. Eyes: Negative. Respiratory:  Negative for cough, hemoptysis, sputum production and wheezing. Cardiovascular: Negative. Gastrointestinal: Negative. Genitourinary: Negative. Musculoskeletal:  Positive for back pain, falls, joint pain and myalgias. Negative for neck pain. Skin: Negative. Neurological:  Negative for dizziness, tingling, sensory change, speech change, focal weakness, seizures, loss of consciousness and headaches. Psychiatric/Behavioral:  Negative for depression, memory loss, substance abuse and suicidal ideas. The patient is nervous/anxious and has insomnia.          Visit Vitals  /70 (BP 1 Location: Left upper arm, BP Patient Position: Sitting, BP Cuff Size: Large adult)   Pulse 76   Temp 98.6 °F (37 °C) (Temporal)   Resp 20   Ht 5' 6\" (1.676 m)   Wt 190 lb (86.2 kg)   SpO2 98%   BMI 30.67 kg/m²       Physical Exam  Vitals and nursing note reviewed. Constitutional:       Appearance: Normal appearance. HENT:      Right Ear: Tympanic membrane normal.      Left Ear: Tympanic membrane normal.   Eyes:      Pupils: Pupils are equal, round, and reactive to light. Cardiovascular:      Rate and Rhythm: Normal rate and regular rhythm. Pulses: Normal pulses. Heart sounds: Murmur heard. Pulmonary:      Effort: Pulmonary effort is normal.   Abdominal:      General: Bowel sounds are normal.      Palpations: Abdomen is soft. Tenderness: There is no abdominal tenderness. There is no guarding. Musculoskeletal:      Cervical back: Normal.      Thoracic back: Normal.      Lumbar back: Normal.      Right hip: Tenderness present. No deformity or crepitus. Normal range of motion. Left hip: No deformity, tenderness or crepitus. Normal range of motion. Legs:    Skin:     General: Skin is warm and dry. Neurological:      Mental Status: She is alert. Sensory: Sensation is intact. Motor: Motor function is intact. Coordination: Coordination is intact. Gait: Gait abnormal.      Comments: Uses cane   Psychiatric:         Mood and Affect: Mood normal.         Behavior: Behavior normal.          1. Hyperlipidemia, unspecified hyperlipidemia type  Lab Results   Component Value Date/Time    LDL, calculated 80 12/07/2022 12:00 AM   Stable and continues atorvastatin 20mg daily     2. Gastroesophageal reflux disease without esophagitis  Controlled and on prilosec as directed    3. Essential hypertension  Blood pressure is controlled and continue metoprolol as directed  Monitor readings at home and notify provider if > 140/90      4.  COPD ([address]  Does report sob with exertion but no wheezing or cough  Has albuterol inhaler she uses prn   CXR showed no acute process 9/2022  Recommended by cardiology to follow up with pulmonology but patient has declined to have this eval.    5. Vitamin D deficiency  Lab Results   Component Value Date/Time    VITAMIN D, 25-HYDROXY 54.1 09/13/2022 09:30 AM   Continues vitamin d and calcium supplement     6. Anxiety  Uses alprazolam prn and was seen Charlette Falcon NP who was prescribing. Not taking the remeron and other SSRI's due to side effects per patient  Will refill the alprazolam but advised this needs to be use prn   If symptoms become daily, she has been advised to follow up with provider   Controlled Substance Monitoring:    RX Monitoring 3/13/2023   Periodic Controlled Substance Monitoring Possible medication side effects, risk of tolerance/dependence & alternative treatments discussed. ;No signs of potential drug abuse or diversion identified. 7. History of CVA (cerebrovascular accident)  On ASA 81mg     8. Age-related osteoporosis without current pathological fracture  On prolia injections every 6 months    9. Coronary artery disease involving native heart without angina pectoris, unspecified vessel or lesion type  No hx of stents  On ASA and atorvastatin  Follows with [facility]    10. Nonrheumatic mitral valve regurgitation  07/28/21    ECHO ADULT COMPLETE 07/28/2021 7/28/2021    Interpretation Summary  · LV: Estimated LVEF is 60 - 65%. Normal cavity size, wall thickness and systolic function (ejection fraction normal). Wall motion: normal. Age-appropriate left ventricular diastolic function. · LA: Mildly dilated left atrium. Left Atrium volume index is 31 mL/m2. · AV: Mild aortic valve leaflet calcification present without reduced excursion. · MV: Mild mitral valve regurgitation is present. · TV: Right Ventricular Arterial Pressure (RVSP) is 36 mmHg.     Signed by: Charlie Wilde MD on 7/28/2021  3:21 PM  Follows DISPLAY PLAN FREE TEXT with [facility]    11. Chronic  low back pain without sciatica  No current pain in low back  Encourage range of motion and strengthening exercises  May use tylenol and/or heat prn for pain    12. History of breast cancer  Initially did not want to continue monitoring and mammograms but again today states that she would like to have done locally. New order  sent again today and contact information provided to patient to schedule. 13. Fatigue, unspecified type  Likely multifactorial due to multiple chronic conditions likely contributing  Checking labwork  She does report difficulty sleeping,daytime fatigue and possible snoring  Referring to pulmonology to evaluate further for dyspnea and sleep study    14. Vitamin B12 deficiency  Lab Results   Component Value Date/Time    Vitamin B12 325 09/13/2022 09:30 AM    Folate 18.6 09/13/2022 09:30 AM    Continue b12 supplement 500mcq daily    14. Left wrist pain  Tenderness dorsum left wrist along with reduced  strength  Going to check xray left wrist  Continue to encourage rang of motion exercises along with splinting  May use tylenol prn for pain  Discuss further recommendations pending test results  - XR WRIST LT AP/LAT; Future    15. Right hip pain  Tenderness generalized to right hip but no signs of shortening of extremity or increasing pain with ambulating. Going to check xray of right hip but likely order CT given duration of symptoms  Continue to encourage range of motion exercises as tolerated  May use tylenol prn for pain  Discuss further recommendations pending test results  - XR HIP RT W OR WO PELV 2-3 VWS; Future     Patient verbalizes understanding of plan of care as discussed above and informed if develops worsening symptoms such as chest pain  to follow up immediately with provider or seek emergency care if needed. Follow-up and Dispositions    Return in about 6 weeks (around 4/24/2023) for or sooner for worsening symptoms.

## 2023-03-13 NOTE — HISTORY OF PRESENT ILLNESS
[FreeTextEntry1] : This 32 y/ with a hx of serous borderline tumor of the ovary since 4/2019 s/p ex-lap, LSO, omentectomy, b/l PPALND, multiple peritoneal biopsies. 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 TUVS on last visit was normal: Right ovary with 2.4 simple cyst. Normal uterus, no concerning findings.  Patient presents to office for an ultrasound. \par \par Ca125 03/06/2023: 16 \par \par At our last visit we discussed that she had a cyst on right ovary 1 year ago which was the same size likely physiologic. No concern and recommended 6 month follow up as she is now 3 years after diagnosis. She would like to continue follow up every 6 months for 5 years per Dr. Alberto recommendations. She is now on maternity leave and delivered a healthy baby. \par \par \par \par

## 2023-03-13 NOTE — END OF VISIT
[FreeTextEntry3] : Follow up for US in 6 months\par  [FreeTextEntry2] : Roslyn Lockhart MA was present the entire duration of the patient interaction and gynecological exam.\par

## 2023-03-30 ENCOUNTER — APPOINTMENT (OUTPATIENT)
Dept: MAMMOGRAPHY | Facility: CLINIC | Age: 33
End: 2023-03-30
Payer: COMMERCIAL

## 2023-03-30 ENCOUNTER — APPOINTMENT (OUTPATIENT)
Dept: ULTRASOUND IMAGING | Facility: CLINIC | Age: 33
End: 2023-03-30
Payer: COMMERCIAL

## 2023-03-30 PROCEDURE — G0279: CPT

## 2023-03-30 PROCEDURE — 77066 DX MAMMO INCL CAD BI: CPT

## 2023-03-30 PROCEDURE — 76641 ULTRASOUND BREAST COMPLETE: CPT | Mod: 50

## 2023-06-29 NOTE — ASU PREOP CHECKLIST - CHLOROHEXIDINE WASH 3
PROCEDURE NOTE: Lucentis 0.5mg PFS #1 OS. Diagnosis: Central Retinal Vein Occlusion with Macular Edema. Anesthesia: Topical. Prep: Betadine Flush. Prior to injection, risks/benefits/alternatives discussed including but not limited to infection, loss of vision or eye, hemorrhage, cataract, glaucoma, retinal tears or detachment. The patient wished to proceed with treatment. Topical anesthesia was induced with Alcaine. Additional anesthesia was achieved using drop(s) or injection checked above. A drop of Povidone-iodine 5% ophthalmic solution was instilled over the injection site and in the inferior fornix. Betadine prep was performed. A single use prefilled syringe of intravitreal Lucentis 0.5mg/0.05ml was used and excess discarded. The needle was passed 3.0 mm posterior to the limbus in pseudophakic patients, and 3.5 mm posterior to the limbus in phakic patients. The remainder of the Lucentis 0.5mg in the single-use vial was then discarded in a medical waste disposal container. The eye was irrigated with sterile irrigating solution. Patient tolerated the procedure well. There were no complications. Post procedure instructions given. CF vision checked. Injection Time 2:08PM. The patient was instructed to return for re-evaluation in approximately 4-12 weeks depending on his/her condition and was told to call immediately if vision decreases and/or if his/her eye becomes red, painful, and/or light sensitive. The patient was instructed to go to the emergency room or call 911 if unable to reach the doctor within an hour or two of trying or calling. Shashank Mcgowan None 07-Apr-2019 10:35

## 2023-08-11 ENCOUNTER — NON-APPOINTMENT (OUTPATIENT)
Age: 33
End: 2023-08-11

## 2023-09-21 NOTE — H&P PST ADULT - ASSESSMENT
Clofazimine Pregnancy And Lactation Text: This medication is Pregnancy Category C and isn't considered safe during pregnancy. It is excreted in breast milk. 30 year old nulligravida female present after exploratory laparotomy left ovarian salpingo oophorectomy for suspicious ovarian mass  subsequently doing well.  It was also noted during her work up to have a endometrial polyp. She is now schedule for D & C hysteroscopy possible myosure.

## 2023-10-13 LAB — CANCER AG125 SERPL-ACNC: 17 U/ML

## 2023-10-14 ENCOUNTER — APPOINTMENT (OUTPATIENT)
Dept: ULTRASOUND IMAGING | Facility: CLINIC | Age: 33
End: 2023-10-14
Payer: COMMERCIAL

## 2023-10-14 ENCOUNTER — OUTPATIENT (OUTPATIENT)
Dept: OUTPATIENT SERVICES | Facility: HOSPITAL | Age: 33
LOS: 1 days | End: 2023-10-14

## 2023-10-14 DIAGNOSIS — Z00.8 ENCOUNTER FOR OTHER GENERAL EXAMINATION: ICD-10-CM

## 2023-10-14 DIAGNOSIS — Z98.890 OTHER SPECIFIED POSTPROCEDURAL STATES: Chronic | ICD-10-CM

## 2023-10-14 DIAGNOSIS — N36.9 URETHRAL DISORDER, UNSPECIFIED: Chronic | ICD-10-CM

## 2023-10-14 DIAGNOSIS — Z90.721 ACQUIRED ABSENCE OF OVARIES, UNILATERAL: Chronic | ICD-10-CM

## 2023-10-14 PROCEDURE — 76856 US EXAM PELVIC COMPLETE: CPT | Mod: 26

## 2023-10-14 PROCEDURE — 76830 TRANSVAGINAL US NON-OB: CPT | Mod: 26

## 2023-10-19 ENCOUNTER — APPOINTMENT (OUTPATIENT)
Dept: GYNECOLOGIC ONCOLOGY | Facility: CLINIC | Age: 33
End: 2023-10-19
Payer: COMMERCIAL

## 2023-10-19 VITALS — OXYGEN SATURATION: 97 % | DIASTOLIC BLOOD PRESSURE: 84 MMHG | SYSTOLIC BLOOD PRESSURE: 125 MMHG | HEART RATE: 81 BPM

## 2023-10-19 DIAGNOSIS — D39.10 NEOPLASM OF UNCERTAIN BEHAVIOR OF UNSPECIFIED OVARY: ICD-10-CM

## 2023-10-19 PROCEDURE — 99212 OFFICE O/P EST SF 10 MIN: CPT

## 2023-10-30 NOTE — H&P PST ADULT - ENERGY EXPENDITURE (METS)
TRANSFER - OUT REPORT:    Verbal report given to Uriel Daniels RN on Kiara Carcamo  being transferred to ICU 6 for routine progression of patient care       Report consisted of patient's Situation, Background, Assessment and   Recommendations(SBAR). Information from the following report(s) Nurse Handoff Report, Neuro Assessment, and Event Log was reviewed with the receiving nurse. Lines:   Peripheral IV 10/30/23 Right;Ventral Cephalic (Active)       Peripheral IV 10/30/23 Left;Posterior Hand (Active)       Peripheral IV 10/30/23 Left;Posterior Hand (Active)       Arterial Line 10/30/23 Radial (Active)        Opportunity for questions and clarification was provided.       Patient transported with:  Monitor, O2 @ 10lpm, and Registered Nurse, CRNA
>4

## 2024-06-25 NOTE — OB RN TRIAGE NOTE - COMFORT/ACCEPTABLE PAIN LEVEL (0-10)
What Type Of Note Output Would You Prefer (Optional)?: Standard Output Is This A New Presentation, Or A Follow-Up?: Skin Lesion 5

## 2024-11-06 ENCOUNTER — APPOINTMENT (OUTPATIENT)
Dept: GYNECOLOGIC ONCOLOGY | Facility: CLINIC | Age: 34
End: 2024-11-06

## 2024-11-06 DIAGNOSIS — D39.10 NEOPLASM OF UNCERTAIN BEHAVIOR OF UNSPECIFIED OVARY: ICD-10-CM

## 2024-11-06 PROCEDURE — 99212 OFFICE O/P EST SF 10 MIN: CPT

## 2024-11-06 PROCEDURE — 99459 PELVIC EXAMINATION: CPT

## 2025-04-07 ENCOUNTER — APPOINTMENT (OUTPATIENT)
Dept: ULTRASOUND IMAGING | Facility: CLINIC | Age: 35
End: 2025-04-07
Payer: COMMERCIAL

## 2025-04-07 ENCOUNTER — APPOINTMENT (OUTPATIENT)
Dept: MAMMOGRAPHY | Facility: CLINIC | Age: 35
End: 2025-04-07

## 2025-04-07 PROCEDURE — 76641 ULTRASOUND BREAST COMPLETE: CPT | Mod: 50

## 2025-04-17 NOTE — OB RN PATIENT PROFILE - BP NONINVASIVE SYSTOLIC (MM HG)
4/22/2025/ /  DR. BRAYAN COLEMAN    COLONOSCOPY    ICD10: R10.84, K59.00  CPT: 87656    PRE-AUTH: NPR PER [employer],, REF# [id number]/[facility] MEDICAID     -EN 4/17/2025                    
[id number]

## 2025-07-15 ENCOUNTER — NON-APPOINTMENT (OUTPATIENT)
Age: 35
End: 2025-07-15

## 2025-07-15 ENCOUNTER — APPOINTMENT (OUTPATIENT)
Dept: COLORECTAL SURGERY | Facility: CLINIC | Age: 35
End: 2025-07-15
Payer: COMMERCIAL

## 2025-07-15 VITALS
HEIGHT: 63 IN | WEIGHT: 215 LBS | SYSTOLIC BLOOD PRESSURE: 127 MMHG | HEART RATE: 116 BPM | DIASTOLIC BLOOD PRESSURE: 91 MMHG | BODY MASS INDEX: 38.09 KG/M2 | TEMPERATURE: 98 F | RESPIRATION RATE: 15 BRPM

## 2025-07-15 PROBLEM — K59.09 CHRONIC CONSTIPATION: Status: ACTIVE | Noted: 2025-07-15

## 2025-07-15 PROBLEM — Z12.11 COLON CANCER SCREENING: Status: ACTIVE | Noted: 2025-07-15

## 2025-07-15 PROBLEM — K64.8 HEMORRHOIDS, INTERNAL, WITH BLEEDING: Status: ACTIVE | Noted: 2025-07-15

## 2025-07-15 PROCEDURE — 46221 LIGATION OF HEMORRHOID(S): CPT

## 2025-07-15 PROCEDURE — 99203 OFFICE O/P NEW LOW 30 MIN: CPT | Mod: 25

## 2025-07-15 RX ORDER — HYDROCORTISONE ACETATE 25 MG/1
25 SUPPOSITORY RECTAL
Qty: 7 | Refills: 0 | Status: ACTIVE | COMMUNITY
Start: 2025-07-15 | End: 1900-01-01

## 2025-08-07 ENCOUNTER — APPOINTMENT (OUTPATIENT)
Dept: COLORECTAL SURGERY | Facility: CLINIC | Age: 35
End: 2025-08-07